# Patient Record
Sex: FEMALE | Race: BLACK OR AFRICAN AMERICAN | Employment: FULL TIME | URBAN - METROPOLITAN AREA
[De-identification: names, ages, dates, MRNs, and addresses within clinical notes are randomized per-mention and may not be internally consistent; named-entity substitution may affect disease eponyms.]

---

## 2017-02-02 ENCOUNTER — HOSPITAL ENCOUNTER (OUTPATIENT)
Dept: MAMMOGRAPHY | Age: 42
Discharge: HOME | End: 2017-02-02
Attending: OBSTETRICS & GYNECOLOGY | Admitting: OBSTETRICS & GYNECOLOGY

## 2017-02-02 DIAGNOSIS — Z12.31 ENCOUNTER FOR SCREENING MAMMOGRAM FOR BREAST CANCER: ICD-10-CM

## 2018-08-30 ENCOUNTER — HOSPITAL ENCOUNTER (OUTPATIENT)
Dept: MAMMOGRAPHY | Age: 43
Discharge: HOME | End: 2018-09-04
Payer: COMMERCIAL

## 2018-08-30 DIAGNOSIS — Z12.31 VISIT FOR SCREENING MAMMOGRAM: ICD-10-CM

## 2018-08-30 PROCEDURE — 77067 SCR MAMMO BI INCL CAD: CPT

## 2019-08-29 ENCOUNTER — HOSPITAL ENCOUNTER (OUTPATIENT)
Dept: MAMMOGRAPHY | Age: 44
Discharge: HOME | End: 2019-09-03
Payer: COMMERCIAL

## 2019-08-29 DIAGNOSIS — Z12.31 VISIT FOR SCREENING MAMMOGRAM: ICD-10-CM

## 2019-08-29 PROCEDURE — 77063 BREAST TOMOSYNTHESIS BI: CPT

## 2020-10-23 ENCOUNTER — HOSPITAL ENCOUNTER (OUTPATIENT)
Dept: MAMMOGRAPHY | Age: 45
Discharge: HOME | End: 2020-10-23
Payer: COMMERCIAL

## 2020-10-23 PROCEDURE — 77063 BREAST TOMOSYNTHESIS BI: CPT

## 2021-02-16 ENCOUNTER — HOSPITAL ENCOUNTER (OUTPATIENT)
Dept: PHYSICAL THERAPY | Age: 46
Setting detail: THERAPIES SERIES
Discharge: HOME | End: 2021-02-16
Payer: COMMERCIAL

## 2021-02-16 PROCEDURE — 97110 THERAPEUTIC EXERCISES: CPT

## 2021-02-16 PROCEDURE — 97161 PT EVAL LOW COMPLEX 20 MIN: CPT

## 2021-02-16 NOTE — FLOWSHEET NOTE
[facility] Physical Therapy  Phone: [phone]   Fax: [phone]    Physical Therapy Daily Treatment Note  Date:  2021    Patient Name:  Robyn Villela    :  1975  MRN: [id number]  Medical/Treatment Diagnosis Information:  Diagnosis: low back pain  Treatment Diagnosis: decreased thor spine mobility, limited B GHJ AROM, decreased L GHJ strength, adhesions in soft tissue through scapulothoracic region on L  Insurance/Certification information:  PT Insurance Information: Aetna (med nec, no dry needling)  Physician Information:  Referring Practitioner: Dominique Mcdonnell MD  Plan of care signed (Y/N): []  Yes [x]  No     Date of Patient follow up with Physician: ?     Progress Report: []  Yes  [x]  No     Date Range for reporting period:  Beginnin21  Ending:     Progress report due (10 Rx/or 30 days whichever is less): visit #10 or      Recertification due (POC duration/ or 90 days whichever is less): visit #10 or 21     Visit # Insurance Allowable Auth required?  Date Range   1/10 Med nec, NO DRY NEEDLING []  Yes  [x]  No n/a     Latex Allergy:  [x]NO      []YES  Preferred Language for Healthcare:   [x]English       []other:    Functional Scale:        Date assessed:  QDASH: raw score = 31; dysfunction = 45%                               21      Pain level:  7/10     SUBJECTIVE:  See eval    OBJECTIVE: See eval      RESTRICTIONS/PRECAUTIONS:     Exercises/Interventions:     Therapeutic Exercises (48944) Resistance / level Sets/sec Reps Notes   Wand flexion  1 10    Wand abd  1 10    Sitting scap retract   1 10                                              Therapeutic Activities (70270)                                          Neuromuscular Re-ed (41854)                                                 Manual Intervention (91958)                                                     Modalities:     Pt. Education:  : patient educated on diagnosis, prognosis and expectations for rehab, all patient questions were answered    Home Exercise Program:  Access Code: [facility]   URL: [email or web address]   Date: 02/16/2021   Prepared by: Zehra Figueroa     Exercises   Supine Shoulder Flexion with Dowel - 10 reps - 3 sets - 1x daily - 7x weekly   Supine Shoulder Abduction AAROM with Dowel - 10 reps - 3 sets - 1x daily - 7x weekly   Seated Scapular Retraction - 10 reps - 3 sets - 1x daily - 7x weekly         Therapeutic Exercise and NMR EXR  [x] (62775) Provided verbal/tactile cueing for activities related to strengthening, flexibility, endurance, ROM for improvements in  [] LE / Lumbar: LE, proximal hip, and core control with self care, mobility, lifting, ambulation. [x] UE / Cervical: cervical, postural, scapular, scapulothoracic and UE control with self care, reaching, carrying, lifting, house/yardwork, driving, computer work.  [] (38056) Provided verbal/tactile cueing for activities related to improving balance, coordination, kinesthetic sense, posture, motor skill, proprioception to assist with   [] LE / lumbar: LE, proximal hip, and core control in self care, mobility, lifting, ambulation and eccentric single leg control. [] UE / cervical: cervical, scapular, scapulothoracic and UE control with self care, reaching, carrying, lifting, house/yardwork, driving, computer work.   [] (11263) Therapist is in constant attendance of 2 or more patients providing skilled therapy interventions, but not providing any significant amount of measurable one-on-one time to either patient, for improvements in  [] LE / lumbar: LE, proximal hip, and core control in self care, mobility, lifting, ambulation and eccentric single leg control. [] UE / cervical: cervical, scapular, scapulothoracic and UE control with self care, reaching, carrying, lifting, house/yardwork, driving, computer work.      NMR and Therapeutic Activities:    [] (66658 or 40424) Provided verbal/tactile cueing for activities related to improving balance, coordination, kinesthetic sense, posture, motor skill, proprioception and motor activation to allow for proper function of   [] LE: / Lumbar core, proximal hip and LE with self care and ADLs  [] UE / Cervical: cervical, postural, scapular, scapulothoracic and UE control with self care, carrying, lifting, driving, computer work.   [] (20834) Gait Re-education- Provided training and instruction to the patient for proper LE, core and proximal hip recruitment and positioning and eccentric body weight control with ambulation re-education including up and down stairs     Home Management Training / Self Care:  [] (76923) Provided self-care/home management training related to activities of daily living and compensatory training, and/or use of adaptive equipment for improvement with: ADLs and compensatory training, meal preparation, safety procedures and instruction in use of adaptive equipment, including bathing, grooming, dressing, personal hygiene, basic household cleaning and chores.      Home Exercise Program:    [x] (06711) Reviewed/Progressed HEP activities related to strengthening, flexibility, endurance, ROM of   [] LE / Lumbar: core, proximal hip and LE for functional self-care, mobility, lifting and ambulation/stair navigation   [x] UE / Cervical: cervical, postural, scapular, scapulothoracic and UE control with self care, reaching, carrying, lifting, house/yardwork, driving, computer work  [] (36348)Reviewed/Progressed HEP activities related to improving balance, coordination, kinesthetic sense, posture, motor skill, proprioception of   [] LE: core, proximal hip and LE for self care, mobility, lifting, and ambulation/stair navigation    [] UE / Cervical: cervical, postural,  scapular, scapulothoracic and UE control with self care, reaching, carrying, lifting, house/yardwork, driving, computer work    Manual Treatments:  PROM / STM / Oscillations-Mobs:  G-I, II, III, IV (PA's, Inf., Post.)  [] (52033) Provided manual therapy to mobilize LE, proximal hip and/or LS spine soft tissue/joints for the purpose of modulating pain, promoting relaxation,  increasing ROM, reducing/eliminating soft tissue swelling/inflammation/restriction, improving soft tissue extensibility and allowing for proper ROM for normal function with   [] LE / lumbar: self care, mobility, lifting and ambulation. [] UE / Cervical: self care, reaching, carrying, lifting, house/yardwork, driving, computer work. Modalities:  [] (53600) Vasopneumatic compression: Utilized vasopneumatic compression to decrease edema / swelling for the purpose of improving mobility and quad tone / recruitment which will allow for increased overall function including but not limited to self-care, transfers, ambulation, and ascending / descending stairs. Charges:  Timed Code Treatment Minutes: 30   Total Treatment Minutes: 45     [x] EVAL - LOW (75492) x 1  [] EVAL - MOD (21187)  [] EVAL - HIGH (42484)  [] RE-EVAL (93385)  [x] TQ(79481) x 2       [] Ionto  [] NMR (11368) x       [] Vaso  [] Manual (99285) x       [] Ultrasound  [] TA x        [] Mech Traction (15286)  [] Aquatic Therapy x     [] ES (un) (91488):   [] Home Management Training x  [] ES(attended) (80869)   [] Dry Needling 1-2 muscles (29184):  [] Dry Needling 3+ muscles (571838)  [] Group:      [] Other:     GOALS:  Short Term Goals: To be achieved in: 2 weeks  1. Independent in HEP and progression per patient tolerance, in order to prevent re-injury. []? Progressing: []? Met: []? Not Met: []? Adjusted  2. Patient will have a decrease in pain to facilitate improvement in movement, function, and ADLs as indicated by Functional Deficits. []? Progressing: []? Met: []? Not Met: []? Adjusted     Long Term Goals: To be achieved in: 5 weeks  1.  Disability index score of 10% or less for the QDASH to assist with reaching prior level of function. []? Progressing: []? Met: []? Not Met: []? Adjusted  2. Patient will demonstrate increased AROM in L GHJ to be equal or greater than ROM in R GHJ to allow for proper joint functioning as indicated by patients Functional Deficits. []? Progressing: []? Met: []? Not Met: []? Adjusted  3. Patient will demonstrate an increase in postural awareness and control and activation of deep cervical stabilizers to allow for proper functional mobility as indicated by patients Functional Deficits. []? Progressing: []? Met: []? Not Met: []? Adjusted  4. Patient will demonstrate an increase in Strength in L GHJ to 5/5 to allow for proper functional mobility as indicated by patients Functional Deficits. []? Progressing: []? Met: []? Not Met: []? Adjusted    Overall Progression Towards Functional goals/ Treatment Progress Update:  [] Patient is progressing as expected towards functional goals listed. [] Progression is slowed due to complexities/Impairments listed. [] Progression has been slowed due to co-morbidities.   [x] Plan just implemented, too soon to assess goals progression <30days   [] Goals require adjustment due to lack of progress  [] Patient is not progressing as expected and requires additional follow up with physician  [] Other    Persisting Functional Limitations/Impairments:  []Sleeping []Sitting               []Standing []Transfers        []Walking []Kneeling               []Stairs []Squatting / bending   []ADLs [x]Reaching  [x]Lifting  []Housework  [x]Driving []Job related tasks  []Sports/Recreation []Other:        ASSESSMENT:  See eval    Treatment/Activity Tolerance:  [x] Patient able to complete tx  [] Patient limited by fatigue  [] Patient limited by pain  [] Patient limited by other medical complications  [] Other:     Prognosis: [x] Good [] Fair  [] Poor    Patient Requires Follow-up: [x] Yes  [] No    Plan for next treatment session:  Manual to L GHJ and thor spine for increased mobility, L GHJ strengthening, mid back strength, posture retraining, MOC     PLAN: See eval. PT 2x / week for 5 weeks. [] Continue per plan of care [] Alter current plan (see comments)  [x] Plan of care initiated [] Hold pending MD visit [] Discharge    Electronically signed by: Rosy John PT, DPT    Note: If patient does not return for scheduled/ recommended follow up visits, this note will serve as a discharge from care along with most recent update on progress.
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
## 2021-02-23 ENCOUNTER — HOSPITAL ENCOUNTER (OUTPATIENT)
Dept: PHYSICAL THERAPY | Age: 46
Setting detail: THERAPIES SERIES
Discharge: HOME | End: 2021-02-23
Payer: COMMERCIAL

## 2021-02-23 PROCEDURE — 97140 MANUAL THERAPY 1/> REGIONS: CPT

## 2021-02-23 PROCEDURE — 97110 THERAPEUTIC EXERCISES: CPT

## 2021-02-23 NOTE — FLOWSHEET NOTE
[address] [facility]  Phone: [phone]   Fax: [phone]    Physical Therapy Daily Treatment Note  Date:  2021    Patient Name:  Tonya Hernandez    :  1975  MRN: [id number]  Medical/Treatment Diagnosis Information:  Diagnosis: low back pain (actually thor pain)  Treatment Diagnosis: decreased thor spine mobility, limited B GHJ AROM, decreased L GHJ strength, adhesions in soft tissue through scapulothoracic region on L  Insurance/Certification information:  PT Insurance Information: [employer] (med nec, no dry needling)  Physician Information:  Referring Practitioner: Aliya Camarena MD  Plan of care signed (Y/N): []  Yes [x]  No     Date of Patient follow up with Physician: ?     Progress Report: []  Yes  [x]  No     Date Range for reporting period:  Beginnin21  Ending:     Progress report due (10 Rx/or 30 days whichever is less): visit #10 or 98     Recertification due (POC duration/ or 90 days whichever is less): visit #10 or 21     Visit # Insurance Allowable Auth required? Date Range   2/10 Med nec, NO DRY NEEDLING []  Yes  [x]  No n/a     Latex Allergy:  [x]NO      []YES  Preferred Language for Healthcare:   [x]English       []other:    Functional Scale:        Date assessed:  QDASH: raw score = 31; dysfunction = 45%                               21      Pain level:  6.5/10     SUBJECTIVE: Pt reports she has been doing her HEP. Pain remains high.      OBJECTIVE:   : pt 15 min late       RESTRICTIONS/PRECAUTIONS:     Exercises/Interventions:     Therapeutic Exercises (12229) Resistance / level Sets/sec Reps Notes   Wand flexion     Wand abd     Sitting scap retract      UBE   X 2 min forward/ 2 min retro  About 10 rpm   Cables:  Mid rows   LPD   15#  15#   2  2   x10  x10                                Therapeutic Activities (45123)                                          Neuromuscular Re-ed (25916)       Thor ext over bolster in sitting   Scap retract around bolster in sitting  Red  Red  10 sec H  5 sec H x10  x15                                       Manual Intervention (19679)       DMT to B UTs and parapsinals, PA mobs to thor spine centrally and generally grade 2    X 8 min                                            Modalities:   2/23: MHP to cerv spine and thor spine in supine with towel roll under head x 10 min     Pt. Education:  2/16: patient educated on diagnosis, prognosis and expectations for rehab, all patient questions were answered    Home Exercise Program:  Access Code: [facility]RIAN[facility]   URL: [email or web address]   Date: 02/16/2021   Prepared by: Lawyer Torres     Exercises   Supine Shoulder Flexion with Dowel - 10 reps - 3 sets - 1x daily - 7x weekly   Supine Shoulder Abduction AAROM with Dowel - 10 reps - 3 sets - 1x daily - 7x weekly   Seated Scapular Retraction - 10 reps - 3 sets - 1x daily - 7x weekly         Therapeutic Exercise and NMR EXR  [x] (56770) Provided verbal/tactile cueing for activities related to strengthening, flexibility, endurance, ROM for improvements in  [] LE / Lumbar: LE, proximal hip, and core control with self care, mobility, lifting, ambulation. [x] UE / Cervical: cervical, postural, scapular, scapulothoracic and UE control with self care, reaching, carrying, lifting, house/yardwork, driving, computer work.  [] (57163) Provided verbal/tactile cueing for activities related to improving balance, coordination, kinesthetic sense, posture, motor skill, proprioception to assist with   [] LE / lumbar: LE, proximal hip, and core control in self care, mobility, lifting, ambulation and eccentric single leg control.    [] UE / cervical: cervical, scapular, scapulothoracic and UE control with self care, reaching, carrying, lifting, house/yardwork, driving, computer work.   [] (82713) Therapist is in constant attendance of 2 or more patients providing skilled therapy interventions, but not providing any significant amount of measurable one-on-one time to either patient, for improvements in  [] LE / lumbar: LE, proximal hip, and core control in self care, mobility, lifting, ambulation and eccentric single leg control. [] UE / cervical: cervical, scapular, scapulothoracic and UE control with self care, reaching, carrying, lifting, house/yardwork, driving, computer work. NMR and Therapeutic Activities:    [] (99782 or 25354) Provided verbal/tactile cueing for activities related to improving balance, coordination, kinesthetic sense, posture, motor skill, proprioception and motor activation to allow for proper function of   [] LE: / Lumbar core, proximal hip and LE with self care and ADLs  [] UE / Cervical: cervical, postural, scapular, scapulothoracic and UE control with self care, carrying, lifting, driving, computer work.   [] (22511) Gait Re-education- Provided training and instruction to the patient for proper LE, core and proximal hip recruitment and positioning and eccentric body weight control with ambulation re-education including up and down stairs     Home Management Training / Self Care:  [] (86690) Provided self-care/home management training related to activities of daily living and compensatory training, and/or use of adaptive equipment for improvement with: ADLs and compensatory training, meal preparation, safety procedures and instruction in use of adaptive equipment, including bathing, grooming, dressing, personal hygiene, basic household cleaning and chores.      Home Exercise Program:    [] (09777) Reviewed/Progressed HEP activities related to strengthening, flexibility, endurance, ROM of   [] LE / Lumbar: core, proximal hip and LE for functional self-care, mobility, lifting and ambulation/stair navigation   [] UE / Cervical: cervical, postural, scapular, scapulothoracic and UE control with self care, reaching, carrying, lifting, house/yardwork, driving, computer work  [] progression per patient tolerance, in order to prevent re-injury. []? Progressing: []? Met: []? Not Met: []? Adjusted  2. Patient will have a decrease in pain to facilitate improvement in movement, function, and ADLs as indicated by Functional Deficits. []? Progressing: []? Met: []? Not Met: []? Adjusted     Long Term Goals: To be achieved in: 5 weeks  1. Disability index score of 10% or less for the QDASH to assist with reaching prior level of function. []? Progressing: []? Met: []? Not Met: []? Adjusted  2. Patient will demonstrate increased AROM in L GHJ to be equal or greater than ROM in R GHJ to allow for proper joint functioning as indicated by patients Functional Deficits. []? Progressing: []? Met: []? Not Met: []? Adjusted  3. Patient will demonstrate an increase in postural awareness and control and activation of deep cervical stabilizers to allow for proper functional mobility as indicated by patients Functional Deficits. []? Progressing: []? Met: []? Not Met: []? Adjusted  4. Patient will demonstrate an increase in Strength in L GHJ to 5/5 to allow for proper functional mobility as indicated by patients Functional Deficits. []? Progressing: []? Met: []? Not Met: []? Adjusted    Overall Progression Towards Functional goals/ Treatment Progress Update:  [] Patient is progressing as expected towards functional goals listed. [] Progression is slowed due to complexities/Impairments listed. [] Progression has been slowed due to co-morbidities.   [x] Plan just implemented, too soon to assess goals progression <30days   [] Goals require adjustment due to lack of progress  [] Patient is not progressing as expected and requires additional follow up with physician  [] Other    Persisting Functional Limitations/Impairments:  []Sleeping []Sitting               []Standing []Transfers        []Walking []Kneeling               []Stairs []Squatting / bending   []ADLs [x]Reaching  [x]Lifting

## 2021-02-27 ENCOUNTER — HOSPITAL ENCOUNTER (OUTPATIENT)
Dept: PHYSICAL THERAPY | Age: 46
Setting detail: THERAPIES SERIES
Discharge: HOME | End: 2021-02-27
Payer: COMMERCIAL

## 2021-02-27 PROCEDURE — 97110 THERAPEUTIC EXERCISES: CPT

## 2021-02-27 PROCEDURE — 97140 MANUAL THERAPY 1/> REGIONS: CPT

## 2021-02-27 PROCEDURE — 97112 NEUROMUSCULAR REEDUCATION: CPT

## 2021-02-27 NOTE — FLOWSHEET NOTE
Activities (06025)                                          Neuromuscular Re-ed (33398)       Thor ext over bolster in sitting   Scap retract around bolster in sitting  Red  Red  10 sec H  5 sec H x10  x15    Self foam roll to scap on wall   Rhomboid stretch and pressure with foam roll on wall    X 2 min  X 2 min      UT stretch   LS stretch   30 sec  30 sec x2 B  x2 B                          Manual Intervention (37051)       DMT to B UTs and parapsinals, PA mobs to thor spine centrally and generally grade 2        DTM to L UT and LS, hawkgrips #9 brushes and strums to L UT   X 9 min                                     Modalities:   2/27: MHP to cerv spine and thor spine in supine with warm blanket x 15 min   2/23: MHP to cerv spine and thor spine in supine with towel roll under head x 10 min     Pt. Education:  2/16: patient educated on diagnosis, prognosis and expectations for rehab, all patient questions were answered    Home Exercise Program:  Access Code: [email or web address]  URL: [email or web address]  Date: 02/27/2021  Prepared by: Deneen Ip    Exercises  Seated Cervical Sidebending Stretch - 10 reps - 3 sets - 1x daily - 7x weekly  Seated Levator Scapulae Stretch - 10 reps - 3 sets - 1x daily - 7x weekly    Access Code: [facility]RIANDA [facility]   URL: [email or web address]   Date: 02/16/2021   Prepared by: Deneen Ip     Exercises   Supine Shoulder Flexion with Dowel - 10 reps - 3 sets - 1x daily - 7x weekly   Supine Shoulder Abduction AAROM with Dowel - 10 reps - 3 sets - 1x daily - 7x weekly   Seated Scapular Retraction - 10 reps - 3 sets - 1x daily - 7x weekly         Therapeutic Exercise and NMR EXR  [x] (08629) Provided verbal/tactile cueing for activities related to strengthening, flexibility, endurance, ROM for improvements in  [] LE / Lumbar: LE, proximal hip, and core control with self care, mobility, lifting, ambulation.   [x] UE / Cervical: cervical, postural, scapular, scapulothoracic and UE control with self care, reaching, carrying, lifting, house/yardwork, driving, computer work.  [] (10849) Provided verbal/tactile cueing for activities related to improving balance, coordination, kinesthetic sense, posture, motor skill, proprioception to assist with   [] LE / lumbar: LE, proximal hip, and core control in self care, mobility, lifting, ambulation and eccentric single leg control. [] UE / cervical: cervical, scapular, scapulothoracic and UE control with self care, reaching, carrying, lifting, house/yardwork, driving, computer work.   [] (44204) Therapist is in constant attendance of 2 or more patients providing skilled therapy interventions, but not providing any significant amount of measurable one-on-one time to either patient, for improvements in  [] LE / lumbar: LE, proximal hip, and core control in self care, mobility, lifting, ambulation and eccentric single leg control. [] UE / cervical: cervical, scapular, scapulothoracic and UE control with self care, reaching, carrying, lifting, house/yardwork, driving, computer work.      NMR and Therapeutic Activities:    [x] (28535 or 43441) Provided verbal/tactile cueing for activities related to improving balance, coordination, kinesthetic sense, posture, motor skill, proprioception and motor activation to allow for proper function of   [] LE: / Lumbar core, proximal hip and LE with self care and ADLs  [x] UE / Cervical: cervical, postural, scapular, scapulothoracic and UE control with self care, carrying, lifting, driving, computer work.   [] (74343) Gait Re-education- Provided training and instruction to the patient for proper LE, core and proximal hip recruitment and positioning and eccentric body weight control with ambulation re-education including up and down stairs     Home Management Training / Self Care:  [] (35797) Provided self-care/home management training related to activities of daily living and compensatory training, and/or use of adaptive equipment for improvement with: ADLs and compensatory training, meal preparation, safety procedures and instruction in use of adaptive equipment, including bathing, grooming, dressing, personal hygiene, basic household cleaning and chores. Home Exercise Program:    [] (06793) Reviewed/Progressed HEP activities related to strengthening, flexibility, endurance, ROM of   [] LE / Lumbar: core, proximal hip and LE for functional self-care, mobility, lifting and ambulation/stair navigation   [] UE / Cervical: cervical, postural, scapular, scapulothoracic and UE control with self care, reaching, carrying, lifting, house/yardwork, driving, computer work  [] (44810)Reviewed/Progressed HEP activities related to improving balance, coordination, kinesthetic sense, posture, motor skill, proprioception of   [] LE: core, proximal hip and LE for self care, mobility, lifting, and ambulation/stair navigation    [] UE / Cervical: cervical, postural,  scapular, scapulothoracic and UE control with self care, reaching, carrying, lifting, house/yardwork, driving, computer work    Manual Treatments:  PROM / STM / Oscillations-Mobs:  G-I, II, III, IV (PA's, Inf., Post.)  [x] (30460) Provided manual therapy to mobilize LE, proximal hip and/or LS spine soft tissue/joints for the purpose of modulating pain, promoting relaxation,  increasing ROM, reducing/eliminating soft tissue swelling/inflammation/restriction, improving soft tissue extensibility and allowing for proper ROM for normal function with   [] LE / lumbar: self care, mobility, lifting and ambulation. [x] UE / Cervical: self care, reaching, carrying, lifting, house/yardwork, driving, computer work.      Modalities:  [] (50705) Vasopneumatic compression: Utilized vasopneumatic compression to decrease edema / swelling for the purpose of improving mobility and quad tone / recruitment which will allow for increased overall function including but not limited to self-care, transfers, ambulation, and ascending / descending stairs. Charges:  Timed Code Treatment Minutes: 40   Total Treatment Minutes: 55     [] EVAL - LOW (88872)   [] EVAL - MOD (06563)  [] EVAL - HIGH (11667)  [] RE-EVAL (57423)  [x] GL(47556) x 1       [] Ionto  [x] NMR (27060) x 1      [] Vaso  [x] Manual (73484) x 1      [] Ultrasound  [] TA x        [] Mech Traction (16339)  [] Aquatic Therapy x                     [] ES (un) (38032):   [] Home Management Training x  [] ES(attended) (94082)   [] Dry Needling 1-2 muscles (51055):  [] Dry Needling 3+ muscles (789605)  [] Group:      [] Other:     GOALS:  Short Term Goals: To be achieved in: 2 weeks  1. Independent in HEP and progression per patient tolerance, in order to prevent re-injury. []? Progressing: []? Met: []? Not Met: []? Adjusted  2. Patient will have a decrease in pain to facilitate improvement in movement, function, and ADLs as indicated by Functional Deficits. []? Progressing: []? Met: []? Not Met: []? Adjusted     Long Term Goals: To be achieved in: 5 weeks  1. Disability index score of 10% or less for the QDASH to assist with reaching prior level of function. []? Progressing: []? Met: []? Not Met: []? Adjusted  2. Patient will demonstrate increased AROM in L GHJ to be equal or greater than ROM in R GHJ to allow for proper joint functioning as indicated by patients Functional Deficits. []? Progressing: []? Met: []? Not Met: []? Adjusted  3. Patient will demonstrate an increase in postural awareness and control and activation of deep cervical stabilizers to allow for proper functional mobility as indicated by patients Functional Deficits. []? Progressing: []? Met: []? Not Met: []? Adjusted  4. Patient will demonstrate an increase in Strength in L GHJ to 5/5 to allow for proper functional mobility as indicated by patients Functional Deficits. []? Progressing: []? Met: []? Not Met: []?  Adjusted    Overall Progression Towards Functional goals/ Treatment Progress Update:  [] Patient is progressing as expected towards functional goals listed. [] Progression is slowed due to complexities/Impairments listed. [] Progression has been slowed due to co-morbidities. [x] Plan just implemented, too soon to assess goals progression <30days   [] Goals require adjustment due to lack of progress  [] Patient is not progressing as expected and requires additional follow up with physician  [] Other    Persisting Functional Limitations/Impairments:  []Sleeping []Sitting               []Standing []Transfers        []Walking []Kneeling               []Stairs []Squatting / bending   []ADLs [x]Reaching  [x]Lifting  []Housework  [x]Driving []Job related tasks  []Sports/Recreation []Other:        ASSESSMENT:  Point tender to L UT this date. Heat and stretching feel good. Unable to complete D2 flexion on L arm due to pain. Continue with gentle length, strength, and positioning of scap. Treatment/Activity Tolerance:  [x] Patient able to complete tx  [] Patient limited by fatigue  [] Patient limited by pain  [] Patient limited by other medical complications  [] Other:     Prognosis: [x] Good [] Fair  [] Poor    Patient Requires Follow-up: [x] Yes  [] No    Plan for next treatment session:  Manual to L GHJ and thor spine for increased mobility, L GHJ strengthening, mid back strength, posture retraining, MOC     PLAN: See eliz PT 2x / week for 5 weeks. [x] Continue per plan of care [] Alter current plan (see comments)  [] Plan of care initiated [] Hold pending MD visit [] Discharge    Electronically signed by: Julito Ragland PT, DPT    Note: If patient does not return for scheduled/ recommended follow up visits, this note will serve as a discharge from care along with most recent update on progress.

## 2021-03-02 ENCOUNTER — HOSPITAL ENCOUNTER (OUTPATIENT)
Dept: PHYSICAL THERAPY | Age: 46
Setting detail: THERAPIES SERIES
Discharge: HOME | End: 2021-03-02
Payer: COMMERCIAL

## 2021-03-02 PROCEDURE — 97140 MANUAL THERAPY 1/> REGIONS: CPT

## 2021-03-02 PROCEDURE — 97110 THERAPEUTIC EXERCISES: CPT

## 2021-03-02 PROCEDURE — 97112 NEUROMUSCULAR REEDUCATION: CPT

## 2021-03-02 NOTE — FLOWSHEET NOTE
[address] [facility] Physical Therapy  Phone: [phone]   Fax: [phone]    Physical Therapy Daily Treatment Note  Date:  3/2/2021    Patient Name:  Spike Page    :  1975  MRN: [id number]  Medical/Treatment Diagnosis Information:  Diagnosis: low back pain (actually thor pain)  Treatment Diagnosis: decreased thor spine mobility, limited B GHJ AROM, decreased L GHJ strength, adhesions in soft tissue through scapulothoracic region on L  Insurance/Certification information:  PT Insurance Information: Aetna (med Jemma Hockey, no dry needling)  Physician Information:  Referring Practitioner: Valeria Avila MD  Plan of care signed (Y/N): []  Yes [x]  No     Date of Patient follow up with Physician: ?     Progress Report: []  Yes  [x]  No     Date Range for reporting period:  Beginnin21  Ending:     Progress report due (10 Rx/or 30 days whichever is less): visit #10 or      Recertification due (POC duration/ or 90 days whichever is less): visit #10 or 21     Visit # Insurance Allowable Auth required?  Date Range   4/10 Med nec, NO DRY NEEDLING []  Yes  [x]  No n/a     Latex Allergy:  [x]NO      []YES  Preferred Language for Healthcare:   [x]English       []other:    Functional Scale:        Date assessed:  QDASH: raw score = 31; dysfunction = 45%                               21      Pain level:  6/10      SUBJECTIVE: Pt reports       OBJECTIVE:   : pt 15 min late       RESTRICTIONS/PRECAUTIONS:     Exercises/Interventions:     Therapeutic Exercises (92119) Resistance / level Sets/sec Reps Notes   Wand flexion     Wand abd     Sitting scap retract      UBE    About 10 rpm   Cables:  Mid rows   LPD  D2 flexion    15#  15#     2  2     x10  x10         Attempted on L but too painful    Pulleys:  Flexion  scaption      X 2 min  X 2 min                          Therapeutic Activities (21531)                                          Neuromuscular Re-ed (87793)       Thor ext over bolster in sitting   Scap retract around bolster in sitting  Red  Red  10 sec H  5 sec H x10  x15    Self foam roll to scap on wall   Rhomboid stretch and pressure with foam roll on wall          UT stretch   LS stretch   30 sec  30 sec x2 B  x2 B     1/2 foam roll on wall:  GHJ alt flexion  GJH abd     1  1   x10  x10    Self trigger point release to L UT with theracane    X 3 min            Manual Intervention (71177)       DMT to B UTs and parapsinals, PA mobs to thor spine centrally and generally grade 2        DTM to L UT and LS, hawkgrips #9 brushes and strums to L UT   X 9 min                                     Modalities:   2/27: MHP to cerv spine and thor spine in supine with warm blanket x 15 min   2/23: MHP to cerv spine and thor spine in supine with towel roll under head x 10 min     Pt. Education:  2/16: patient educated on diagnosis, prognosis and expectations for rehab, all patient questions were answered    Home Exercise Program:  Access Code: [email or web address]  URL: [email or web address]  Date: 02/27/2021  Prepared by: Eneida Wolfe    Exercises  Seated Cervical Sidebending Stretch - 10 reps - 3 sets - 1x daily - 7x weekly  Seated Levator Scapulae Stretch - 10 reps - 3 sets - 1x daily - 7x weekly    Access Code: [facility]   URL: [employer]/   Date: 02/16/2021   Prepared by: Eneida Wolfe     Exercises   Supine Shoulder Flexion with Dowel - 10 reps - 3 sets - 1x daily - 7x weekly   Supine Shoulder Abduction AAROM with Dowel - 10 reps - 3 sets - 1x daily - 7x weekly   Seated Scapular Retraction - 10 reps - 3 sets - 1x daily - 7x weekly         Therapeutic Exercise and NMR EXR  [x] (43209) Provided verbal/tactile cueing for activities related to strengthening, flexibility, endurance, ROM for improvements in  [] LE / Lumbar: LE, proximal hip, and core control with self care, mobility, lifting, ambulation.   [x] UE / Cervical: cervical, postural, scapular, scapulothoracic and UE control with self care, reaching, carrying, lifting, house/yardwork, driving, computer work.  [] (80170) Provided verbal/tactile cueing for activities related to improving balance, coordination, kinesthetic sense, posture, motor skill, proprioception to assist with   [] LE / lumbar: LE, proximal hip, and core control in self care, mobility, lifting, ambulation and eccentric single leg control. [] UE / cervical: cervical, scapular, scapulothoracic and UE control with self care, reaching, carrying, lifting, house/yardwork, driving, computer work.   [] (13821) Therapist is in constant attendance of 2 or more patients providing skilled therapy interventions, but not providing any significant amount of measurable one-on-one time to either patient, for improvements in  [] LE / lumbar: LE, proximal hip, and core control in self care, mobility, lifting, ambulation and eccentric single leg control. [] UE / cervical: cervical, scapular, scapulothoracic and UE control with self care, reaching, carrying, lifting, house/yardwork, driving, computer work.      NMR and Therapeutic Activities:    [x] (12172 or 54660) Provided verbal/tactile cueing for activities related to improving balance, coordination, kinesthetic sense, posture, motor skill, proprioception and motor activation to allow for proper function of   [] LE: / Lumbar core, proximal hip and LE with self care and ADLs  [x] UE / Cervical: cervical, postural, scapular, scapulothoracic and UE control with self care, carrying, lifting, driving, computer work.   [] (59161) Gait Re-education- Provided training and instruction to the patient for proper LE, core and proximal hip recruitment and positioning and eccentric body weight control with ambulation re-education including up and down stairs     Home Management Training / Self Care:  [] (23875) Provided self-care/home management training related to activities of daily living and compensatory training, and/or use of adaptive equipment for improvement with: ADLs and compensatory training, meal preparation, safety procedures and instruction in use of adaptive equipment, including bathing, grooming, dressing, personal hygiene, basic household cleaning and chores. Home Exercise Program:    [] (02040) Reviewed/Progressed HEP activities related to strengthening, flexibility, endurance, ROM of   [] LE / Lumbar: core, proximal hip and LE for functional self-care, mobility, lifting and ambulation/stair navigation   [] UE / Cervical: cervical, postural, scapular, scapulothoracic and UE control with self care, reaching, carrying, lifting, house/yardwork, driving, computer work  [] (80469)Reviewed/Progressed HEP activities related to improving balance, coordination, kinesthetic sense, posture, motor skill, proprioception of   [] LE: core, proximal hip and LE for self care, mobility, lifting, and ambulation/stair navigation    [] UE / Cervical: cervical, postural,  scapular, scapulothoracic and UE control with self care, reaching, carrying, lifting, house/yardwork, driving, computer work    Manual Treatments:  PROM / STM / Oscillations-Mobs:  G-I, II, III, IV (PA's, Inf., Post.)  [x] (54563) Provided manual therapy to mobilize LE, proximal hip and/or LS spine soft tissue/joints for the purpose of modulating pain, promoting relaxation,  increasing ROM, reducing/eliminating soft tissue swelling/inflammation/restriction, improving soft tissue extensibility and allowing for proper ROM for normal function with   [] LE / lumbar: self care, mobility, lifting and ambulation. [x] UE / Cervical: self care, reaching, carrying, lifting, house/yardwork, driving, computer work.      Modalities:  [] (91248) Vasopneumatic compression: Utilized vasopneumatic compression to decrease edema / swelling for the purpose of improving mobility and quad tone / recruitment which will allow for increased overall function including but not Towards Functional goals/ Treatment Progress Update:  [] Patient is progressing as expected towards functional goals listed. [] Progression is slowed due to complexities/Impairments listed. [] Progression has been slowed due to co-morbidities. [x] Plan just implemented, too soon to assess goals progression <30days   [] Goals require adjustment due to lack of progress  [] Patient is not progressing as expected and requires additional follow up with physician  [] Other    Persisting Functional Limitations/Impairments:  []Sleeping []Sitting               []Standing []Transfers        []Walking []Kneeling               []Stairs []Squatting / bending   []ADLs [x]Reaching  [x]Lifting  []Housework  [x]Driving []Job related tasks  []Sports/Recreation []Other:        ASSESSMENT:   Pt performed strengthening activities with greater ease today- no report of pain or discomfort. Continues to be limited with mobility, especially into extension. Responds well to hawkgrips. Plan to continue as above. Treatment/Activity Tolerance:  [x] Patient able to complete tx  [] Patient limited by fatigue  [] Patient limited by pain  [] Patient limited by other medical complications  [] Other:     Prognosis: [x] Good [] Fair  [] Poor    Patient Requires Follow-up: [x] Yes  [] No    Plan for next treatment session:  Manual to L GHJ and thor spine for increased mobility, L GHJ strengthening, mid back strength, posture retraining, MOC     PLAN: See yara. PT 2x / week for 5 weeks. [x] Continue per plan of care [] Alter current plan (see comments)  [] Plan of care initiated [] Hold pending MD visit [] Discharge    Electronically signed by: Telly Retana PT, DPT    Note: If patient does not return for scheduled/ recommended follow up visits, this note will serve as a discharge from care along with most recent update on progress.

## 2021-03-04 ENCOUNTER — HOSPITAL ENCOUNTER (OUTPATIENT)
Dept: PHYSICAL THERAPY | Age: 46
Setting detail: THERAPIES SERIES
Discharge: HOME | End: 2021-03-04
Payer: COMMERCIAL

## 2021-03-04 PROCEDURE — 97110 THERAPEUTIC EXERCISES: CPT

## 2021-03-04 PROCEDURE — 97112 NEUROMUSCULAR REEDUCATION: CPT

## 2021-03-04 PROCEDURE — 97140 MANUAL THERAPY 1/> REGIONS: CPT

## 2021-03-04 NOTE — FLOWSHEET NOTE
[address] [facility] Physical Therapy  Phone: [phone]   Fax: [phone]    Physical Therapy Daily Treatment Note  Date:  3/4/2021    Patient Name:  Joaquim Lemus    :  1975  MRN: [id number]  Medical/Treatment Diagnosis Information:  Diagnosis: low back pain (actually thor pain)  Treatment Diagnosis: decreased thor spine mobility, limited B GHJ AROM, decreased L GHJ strength, adhesions in soft tissue through scapulothoracic region on L  Insurance/Certification information:  PT Insurance Information: Aetna (med nec, no dry needling)  Physician Information:  Referring Practitioner: Maria R Streeter MD  Plan of care signed (Y/N): []  Yes [x]  No     Date of Patient follow up with Physician: ?     Progress Report: []  Yes  [x]  No     Date Range for reporting period:  Beginnin21  Ending:     Progress report due (10 Rx/or 30 days whichever is less): visit #10 or      Recertification due (POC duration/ or 90 days whichever is less): visit #10 or 21     Visit # Insurance Allowable Auth required? Date Range   5/10 Med nec, NO DRY NEEDLING []  Yes  [x]  No n/a     Latex Allergy:  [x]NO      []YES  Preferred Language for Healthcare:   [x]English       []other:    Functional Scale:        Date assessed:  QDASH: raw score = 31; dysfunction = 45%                               21      Pain level:  6/10      SUBJECTIVE: Pt reports she feels good after therapy, but it only lasts a couple of days. Today feeling pain between her shoulder blades and along the R side of her spine.        OBJECTIVE:   : pt 15 min late       RESTRICTIONS/PRECAUTIONS:     Exercises/Interventions:     Therapeutic Exercises (26503) Resistance / level Sets/sec Reps Notes   Wand flexion     Wand abd     Sitting scap retract      UBE    About 10 rpm   Cables:  Mid rows   LPD  D2 flexion    15#  15#     2  2     x10  x10         Attempted on L but too painful Pulleys:  Flexion  scaption      X 2 min  X 2 min                          Therapeutic Activities (57427)                                          Neuromuscular Re-ed (18644)       Thor ext over bolster in sitting   Scap retract around bolster in sitting  Red  Red  10 sec H  5 sec H x10  x15    Self foam roll to scap on wall   Rhomboid stretch and pressure with foam roll on wall          UT stretch   LS stretch   30 sec  30 sec x2 B  x2 B     1/2 foam roll on wall:  GHJ alt flexion  GJH abd     1  1   x10  x10    Self trigger point release to L UT with theracane         Wall forearm slide  1 10    Prone:  Retraction  Ext  Row    SL scap retraction      1  1      2   5  5      10 Unable to perform with scap retraction        Requires tactile cueing   Manual Intervention (47545)       DMT to B UTs and parapsinals, PA mobs to thor spine centrally and generally grade 2        DTM to L UT and LS, hawkgrips #9 brushes and strums to L UT, levator scap   X 8 min                                     Modalities:   3/4, 2/27: MHP to cerv spine and thor spine in supine with warm blanket x 15 min   2/23: MHP to cerv spine and thor spine in supine with towel roll under head x 10 min     Pt. Education:  2/16: patient educated on diagnosis, prognosis and expectations for rehab, all patient questions were answered    Home Exercise Program:  Access Code: [email or web address]  URL: [email or web address]  Date: 02/27/2021  Prepared by: Audrey Guthrie    Exercises  Seated Cervical Sidebending Stretch - 10 reps - 3 sets - 1x daily - 7x weekly  Seated Levator Scapulae Stretch - 10 reps - 3 sets - 1x daily - 7x weekly    Access Code: [facility]   URL: [email or web address]/   Date: 02/16/2021   Prepared by: Audrey Guthrie     Exercises   Supine Shoulder Flexion with Dowel - 10 reps - 3 sets - 1x daily - 7x weekly   Supine Shoulder Abduction AAROM with Dowel - 10 reps - 3 sets - 1x daily - 7x weekly   Seated Scapular Retraction - 10 reps - 3 sets - 1x daily - 7x weekly         Therapeutic Exercise and NMR EXR  [x] (88515) Provided verbal/tactile cueing for activities related to strengthening, flexibility, endurance, ROM for improvements in  [] LE / Lumbar: LE, proximal hip, and core control with self care, mobility, lifting, ambulation. [x] UE / Cervical: cervical, postural, scapular, scapulothoracic and UE control with self care, reaching, carrying, lifting, house/yardwork, driving, computer work.  [] (84015) Provided verbal/tactile cueing for activities related to improving balance, coordination, kinesthetic sense, posture, motor skill, proprioception to assist with   [] LE / lumbar: LE, proximal hip, and core control in self care, mobility, lifting, ambulation and eccentric single leg control. [] UE / cervical: cervical, scapular, scapulothoracic and UE control with self care, reaching, carrying, lifting, house/yardwork, driving, computer work.   [] (98216) Therapist is in constant attendance of 2 or more patients providing skilled therapy interventions, but not providing any significant amount of measurable one-on-one time to either patient, for improvements in  [] LE / lumbar: LE, proximal hip, and core control in self care, mobility, lifting, ambulation and eccentric single leg control. [] UE / cervical: cervical, scapular, scapulothoracic and UE control with self care, reaching, carrying, lifting, house/yardwork, driving, computer work.      NMR and Therapeutic Activities:    [x] (00428 or 21524) Provided verbal/tactile cueing for activities related to improving balance, coordination, kinesthetic sense, posture, motor skill, proprioception and motor activation to allow for proper function of   [] LE: / Lumbar core, proximal hip and LE with self care and ADLs  [x] UE / Cervical: cervical, postural, scapular, scapulothoracic and UE control with self care, carrying, lifting, driving, computer work.   [] (66082) Gait Re-education- Provided training and instruction to the patient for proper LE, core and proximal hip recruitment and positioning and eccentric body weight control with ambulation re-education including up and down stairs     Home Management Training / Self Care:  [] (28066) Provided self-care/home management training related to activities of daily living and compensatory training, and/or use of adaptive equipment for improvement with: ADLs and compensatory training, meal preparation, safety procedures and instruction in use of adaptive equipment, including bathing, grooming, dressing, personal hygiene, basic household cleaning and chores. Home Exercise Program:    [] (26711) Reviewed/Progressed HEP activities related to strengthening, flexibility, endurance, ROM of   [] LE / Lumbar: core, proximal hip and LE for functional self-care, mobility, lifting and ambulation/stair navigation   [] UE / Cervical: cervical, postural, scapular, scapulothoracic and UE control with self care, reaching, carrying, lifting, house/yardwork, driving, computer work  [] (83682)Reviewed/Progressed HEP activities related to improving balance, coordination, kinesthetic sense, posture, motor skill, proprioception of   [] LE: core, proximal hip and LE for self care, mobility, lifting, and ambulation/stair navigation    [] UE / Cervical: cervical, postural,  scapular, scapulothoracic and UE control with self care, reaching, carrying, lifting, house/yardwork, driving, computer work    Manual Treatments:  PROM / STM / Oscillations-Mobs:  G-I, II, III, IV (PA's, Inf., Post.)  [x] (25278) Provided manual therapy to mobilize LE, proximal hip and/or LS spine soft tissue/joints for the purpose of modulating pain, promoting relaxation,  increasing ROM, reducing/eliminating soft tissue swelling/inflammation/restriction, improving soft tissue extensibility and allowing for proper ROM for normal function with   [] LE / lumbar: self care, mobility, lifting and ambulation. [x] UE / Cervical: self care, reaching, carrying, lifting, house/yardwork, driving, computer work. Modalities:  [] (64237) Vasopneumatic compression: Utilized vasopneumatic compression to decrease edema / swelling for the purpose of improving mobility and quad tone / recruitment which will allow for increased overall function including but not limited to self-care, transfers, ambulation, and ascending / descending stairs. Charges:  Timed Code Treatment Minutes: 45   Total Treatment Minutes: 60     [] EVAL - LOW (00828)   [] EVAL - MOD (15732)  [] EVAL - HIGH (94354)  [] RE-EVAL (28453)  [x] PN(39483) x 1       [] Ionto  [x] NMR (37895) x 1      [] Vaso  [x] Manual (93530) x 1      [] Ultrasound  [] TA x        [] Mech Traction (58753)  [] Aquatic Therapy x                     [] ES (un) (62109):   [] Home Management Training x  [] ES(attended) (68291)   [] Dry Needling 1-2 muscles (03224):  [] Dry Needling 3+ muscles (180312)  [] Group:      [] Other:     GOALS:  Short Term Goals: To be achieved in: 2 weeks  1. Independent in HEP and progression per patient tolerance, in order to prevent re-injury. []? Progressing: []? Met: []? Not Met: []? Adjusted  2. Patient will have a decrease in pain to facilitate improvement in movement, function, and ADLs as indicated by Functional Deficits. []? Progressing: []? Met: []? Not Met: []? Adjusted     Long Term Goals: To be achieved in: 5 weeks  1. Disability index score of 10% or less for the QDASH to assist with reaching prior level of function. []? Progressing: []? Met: []? Not Met: []? Adjusted  2. Patient will demonstrate increased AROM in L GHJ to be equal or greater than ROM in R GHJ to allow for proper joint functioning as indicated by patients Functional Deficits. []? Progressing: []? Met: []? Not Met: []? Adjusted  3.  Patient will demonstrate an increase in postural awareness and control and activation of deep cervical stabilizers to allow for proper functional mobility as indicated by patients Functional Deficits. []? Progressing: []? Met: []? Not Met: []? Adjusted  4. Patient will demonstrate an increase in Strength in L GHJ to 5/5 to allow for proper functional mobility as indicated by patients Functional Deficits. []? Progressing: []? Met: []? Not Met: []? Adjusted    Overall Progression Towards Functional goals/ Treatment Progress Update:  [] Patient is progressing as expected towards functional goals listed. [] Progression is slowed due to complexities/Impairments listed. [] Progression has been slowed due to co-morbidities. [x] Plan just implemented, too soon to assess goals progression <30days   [] Goals require adjustment due to lack of progress  [] Patient is not progressing as expected and requires additional follow up with physician  [] Other    Persisting Functional Limitations/Impairments:  []Sleeping []Sitting               []Standing []Transfers        []Walking []Kneeling               []Stairs []Squatting / bending   []ADLs [x]Reaching  [x]Lifting  []Housework  [x]Driving []Job related tasks  []Sports/Recreation []Other:        ASSESSMENT:   Attempted to progress scapular strengthening/neuro re-ed this date, however pt unable to produce scap retraction against gravity despite cueing and PT assist. Able to perform in SL with tactile cueing. Pt encouraged to continue working on retraction at home throughout work day. Continue to progress general cervical and thoracic mobility as well as scapular neuro re-ed.      Treatment/Activity Tolerance:  [x] Patient able to complete tx  [] Patient limited by fatigue  [] Patient limited by pain  [] Patient limited by other medical complications  [] Other:     Prognosis: [x] Good [] Fair  [] Poor    Patient Requires Follow-up: [x] Yes  [] No    Plan for next treatment session:  Manual to L GHJ and thor spine for increased mobility, L GHJ strengthening, mid back strength, posture retraining, MOC PLAN: See eval. PT 2x / week for 5 weeks. [x] Continue per plan of care [] Alter current plan (see comments)  [] Plan of care initiated [] Hold pending MD visit [] Discharge    Electronically signed by: Romeo Kerr PT, DPT    Note: If patient does not return for scheduled/ recommended follow up visits, this note will serve as a discharge from care along with most recent update on progress.

## 2021-03-09 ENCOUNTER — HOSPITAL ENCOUNTER (OUTPATIENT)
Dept: PHYSICAL THERAPY | Age: 46
Setting detail: THERAPIES SERIES
Discharge: HOME | End: 2021-03-09
Payer: COMMERCIAL

## 2021-03-09 PROCEDURE — 97112 NEUROMUSCULAR REEDUCATION: CPT

## 2021-03-09 PROCEDURE — 97140 MANUAL THERAPY 1/> REGIONS: CPT

## 2021-03-09 PROCEDURE — 97110 THERAPEUTIC EXERCISES: CPT

## 2021-03-09 NOTE — FLOWSHEET NOTE
[address] [facility] Physical Therapy  Phone: [phone]   Fax: [phone]    Physical Therapy Daily Treatment Note  Date:  3/9/2021    Patient Name:  Rebekah Lopez    :  1975  MRN: [id number]  Medical/Treatment Diagnosis Information:  Diagnosis: low back pain (actually thor pain)  Treatment Diagnosis: decreased thor spine mobility, limited B GHJ AROM, decreased L GHJ strength, adhesions in soft tissue through scapulothoracic region on L  Insurance/Certification information:  PT Insurance Information: Aetna (med Chip Givens, no dry needling)  Physician Information:  Referring Practitioner: Abby Chanel MD  Plan of care signed (Y/N): []  Yes [x]  No     Date of Patient follow up with Physician: ?     Progress Report: []  Yes  [x]  No     Date Range for reporting period:  Beginnin21  Ending:     Progress report due (10 Rx/or 30 days whichever is less): visit #10 or 3/08/78     Recertification due (POC duration/ or 90 days whichever is less): visit #10 or 21     Visit # Insurance Allowable Auth required? Date Range   6/10 Med nec, NO DRY NEEDLING []  Yes  [x]  No n/a     Latex Allergy:  [x]NO      []YES  Preferred Language for Healthcare:   [x]English       []other:    Functional Scale:        Date assessed:  QDASH: raw score = 31; dysfunction = 45%                               21      Pain level:  5/10      SUBJECTIVE: Pt reports she actually feels like she is doing better. Pain is coming down some, but is still present. Tried to add some prone exercises this weekend, but couldn't. Does feel that the popping and clicking has decreased some.        OBJECTIVE:   : pt 15 min late       RESTRICTIONS/PRECAUTIONS:     Exercises/Interventions:     Therapeutic Exercises (77282) Resistance / level Sets/sec Reps Notes   Wand flexion     Wand abd     Sitting scap retract      UBE   3 min forward/ 1 min retro About 10 rpm   Cables:  Mid rows   LPD  D2 flexion Shoulder adduction    15#  15#  5#   2  2  2   x10  x10  x10 B       Attempted on L but too painful    Pulleys:  Flexion  scaption      X 2 min  X 2 min                          Therapeutic Activities (76851)                                          Neuromuscular Re-ed (65914)       Paul Zimmerman ext over bolster in sitting   Scap retract around bolster in sitting     Self foam roll to scap on wall   Rhomboid stretch and pressure with foam roll on wall     UT stretch   LS stretch     1/2 foam roll on wall:  GHJ alt flexion  GJH abd     1/2 foam roll along spine:  GHJ flexion  GHJ abd  X 2 min   X 2 min    Self trigger point release to L UT with theracane         Wall forearm slide     Prone:  Retraction  Ext  Row    SL scap retraction     Prove over SB:  GHJ ext  GHJ abd   Rows       4  2  2   5  5  10                         Manual Intervention (46460)       DMT to B UTs and parapsinals, PA mobs to thor spine centrally and generally grade 2        DTM to L UT and LS, hawkgrips #9 brushes and strums to L UT, levator scap        DTM and trigger point release to L UT   X 8 min                             Modalities:   3/4, 2/27: MHP to cerv spine and thor spine in supine with warm blanket x 15 min   2/23: MHP to cerv spine and thor spine in supine with towel roll under head x 10 min     Pt. Education:  2/16: patient educated on diagnosis, prognosis and expectations for rehab, all patient questions were answered    Home Exercise Program:  Access Code: [email or web address]  URL: [email or web address]/  Date: 02/27/2021  Prepared by: Deneen Ip    Exercises  Seated Cervical Sidebending Stretch - 10 reps - 3 sets - 1x daily - 7x weekly  Seated Levator Scapulae Stretch - 10 reps - 3 sets - 1x daily - 7x weekly    Access Code: [facility]   URL: [employer]/   Date: 02/16/2021   Prepared by: Deneen Ip     Exercises   Supine Shoulder Flexion with Dowel - 10 reps - 3 sets - 1x daily - 7x weekly   Supine Shoulder Abduction AAROM with Dowel - 10 reps - 3 sets - 1x daily - 7x weekly   Seated Scapular Retraction - 10 reps - 3 sets - 1x daily - 7x weekly         Therapeutic Exercise and NMR EXR  [x] (44165) Provided verbal/tactile cueing for activities related to strengthening, flexibility, endurance, ROM for improvements in  [] LE / Lumbar: LE, proximal hip, and core control with self care, mobility, lifting, ambulation. [x] UE / Cervical: cervical, postural, scapular, scapulothoracic and UE control with self care, reaching, carrying, lifting, house/yardwork, driving, computer work.  [] (91782) Provided verbal/tactile cueing for activities related to improving balance, coordination, kinesthetic sense, posture, motor skill, proprioception to assist with   [] LE / lumbar: LE, proximal hip, and core control in self care, mobility, lifting, ambulation and eccentric single leg control. [] UE / cervical: cervical, scapular, scapulothoracic and UE control with self care, reaching, carrying, lifting, house/yardwork, driving, computer work.   [] (58370) Therapist is in constant attendance of 2 or more patients providing skilled therapy interventions, but not providing any significant amount of measurable one-on-one time to either patient, for improvements in  [] LE / lumbar: LE, proximal hip, and core control in self care, mobility, lifting, ambulation and eccentric single leg control. [] UE / cervical: cervical, scapular, scapulothoracic and UE control with self care, reaching, carrying, lifting, house/yardwork, driving, computer work.      NMR and Therapeutic Activities:    [x] (20653 or 27618) Provided verbal/tactile cueing for activities related to improving balance, coordination, kinesthetic sense, posture, motor skill, proprioception and motor activation to allow for proper function of   [] LE: / Lumbar core, proximal hip and LE with self care and ADLs  [x] UE / Cervical: cervical, postural, scapular, scapulothoracic and UE control with self care, carrying, lifting, driving, computer work.   [] (96354) Gait Re-education- Provided training and instruction to the patient for proper LE, core and proximal hip recruitment and positioning and eccentric body weight control with ambulation re-education including up and down stairs     Home Management Training / Self Care:  [] (66637) Provided self-care/home management training related to activities of daily living and compensatory training, and/or use of adaptive equipment for improvement with: ADLs and compensatory training, meal preparation, safety procedures and instruction in use of adaptive equipment, including bathing, grooming, dressing, personal hygiene, basic household cleaning and chores.      Home Exercise Program:    [] (16634) Reviewed/Progressed HEP activities related to strengthening, flexibility, endurance, ROM of   [] LE / Lumbar: core, proximal hip and LE for functional self-care, mobility, lifting and ambulation/stair navigation   [] UE / Cervical: cervical, postural, scapular, scapulothoracic and UE control with self care, reaching, carrying, lifting, house/yardwork, driving, computer work  [] (17317)Reviewed/Progressed HEP activities related to improving balance, coordination, kinesthetic sense, posture, motor skill, proprioception of   [] LE: core, proximal hip and LE for self care, mobility, lifting, and ambulation/stair navigation    [] UE / Cervical: cervical, postural,  scapular, scapulothoracic and UE control with self care, reaching, carrying, lifting, house/yardwork, driving, computer work    Manual Treatments:  PROM / STM / Oscillations-Mobs:  G-I, II, III, IV (PA's, Inf., Post.)  [x] (17342) Provided manual therapy to mobilize LE, proximal hip and/or LS spine soft tissue/joints for the purpose of modulating pain, promoting relaxation,  increasing ROM, reducing/eliminating soft tissue swelling/inflammation/restriction, improving soft tissue extensibility and allowing for proper ROM for normal function with   [] LE / lumbar: self care, mobility, lifting and ambulation. [x] UE / Cervical: self care, reaching, carrying, lifting, house/yardwork, driving, computer work. Modalities:  [] (47188) Vasopneumatic compression: Utilized vasopneumatic compression to decrease edema / swelling for the purpose of improving mobility and quad tone / recruitment which will allow for increased overall function including but not limited to self-care, transfers, ambulation, and ascending / descending stairs. Charges:  Timed Code Treatment Minutes: 40   Total Treatment Minutes: 55     [] EVAL - LOW (37627)   [] EVAL - MOD (86845)  [] EVAL - HIGH (39466)  [] RE-EVAL (26273)  [x] CM(85063) x 1       [] Ionto  [x] NMR (08659) x 1      [] Vaso  [x] Manual (70376) x 1      [] Ultrasound  [] TA x        [] Mech Traction (88222)  [] Aquatic Therapy x                     [] ES (un) (98538):   [] Home Management Training x  [] ES(attended) (71432)   [] Dry Needling 1-2 muscles (31396):  [] Dry Needling 3+ muscles (273864)  [] Group:      [] Other:     GOALS:  Short Term Goals: To be achieved in: 2 weeks  1. Independent in HEP and progression per patient tolerance, in order to prevent re-injury. []? Progressing: []? Met: []? Not Met: []? Adjusted  2. Patient will have a decrease in pain to facilitate improvement in movement, function, and ADLs as indicated by Functional Deficits. []? Progressing: []? Met: []? Not Met: []? Adjusted     Long Term Goals: To be achieved in: 5 weeks  1. Disability index score of 10% or less for the QDASH to assist with reaching prior level of function. []? Progressing: []? Met: []? Not Met: []? Adjusted  2. Patient will demonstrate increased AROM in L GHJ to be equal or greater than ROM in R GHJ to allow for proper joint functioning as indicated by patients Functional Deficits. []? Progressing: []? Met: []? Not Met: []? Adjusted  3.  Patient will demonstrate an increase in postural awareness and control and activation of deep cervical stabilizers to allow for proper functional mobility as indicated by patients Functional Deficits. []? Progressing: []? Met: []? Not Met: []? Adjusted  4. Patient will demonstrate an increase in Strength in L GHJ to 5/5 to allow for proper functional mobility as indicated by patients Functional Deficits. []? Progressing: []? Met: []? Not Met: []? Adjusted    Overall Progression Towards Functional goals/ Treatment Progress Update:  [] Patient is progressing as expected towards functional goals listed. [] Progression is slowed due to complexities/Impairments listed. [] Progression has been slowed due to co-morbidities. [x] Plan just implemented, too soon to assess goals progression <30days   [] Goals require adjustment due to lack of progress  [] Patient is not progressing as expected and requires additional follow up with physician  [] Other    Persisting Functional Limitations/Impairments:  []Sleeping []Sitting               []Standing []Transfers        []Walking []Kneeling               []Stairs []Squatting / bending   []ADLs [x]Reaching  [x]Lifting  []Housework  [x]Driving []Job related tasks  []Sports/Recreation []Other:        ASSESSMENT:  Changed position for prone exercises to supported on SB and pt was able to perform with good form and less pain. Still has quarter sized trigger point in L UT, but does respond to manual techniques. Pt states she has increased muscle tone/tightnes after therex. Plan to continue progressing strength, mobility, and flexibility as able.      Treatment/Activity Tolerance:  [x] Patient able to complete tx  [] Patient limited by fatigue  [] Patient limited by pain  [] Patient limited by other medical complications  [] Other:     Prognosis: [x] Good [] Fair  [] Poor    Patient Requires Follow-up: [x] Yes  [] No    Plan for next treatment session:  Manual to L GHJ and thor spine for increased mobility, L GHJ strengthening, mid back strength, posture retraining, MOC     PLAN: See eval. PT 2x / week for 5 weeks. [x] Continue per plan of care [] Alter current plan (see comments)  [] Plan of care initiated [] Hold pending MD visit [] Discharge    Electronically signed by: Ching Aviles PT, DPT    Note: If patient does not return for scheduled/ recommended follow up visits, this note will serve as a discharge from care along with most recent update on progress.

## 2021-03-11 ENCOUNTER — HOSPITAL ENCOUNTER (OUTPATIENT)
Dept: PHYSICAL THERAPY | Age: 46
Setting detail: THERAPIES SERIES
Discharge: HOME | End: 2021-03-11
Payer: COMMERCIAL

## 2021-03-11 PROCEDURE — 97110 THERAPEUTIC EXERCISES: CPT

## 2021-03-11 PROCEDURE — 97140 MANUAL THERAPY 1/> REGIONS: CPT

## 2021-03-11 NOTE — FLOWSHEET NOTE
[address] [facility] Physical Therapy  Phone: [phone]   Fax: [phone]    Physical Therapy Daily Treatment Note  Date:  3/11/2021    Patient Name:  Kiran Cee    :  1975  MRN: [id number]  Medical/Treatment Diagnosis Information:  Diagnosis: low back pain (actually thor pain)  Treatment Diagnosis: decreased thor spine mobility, limited B GHJ AROM, decreased L GHJ strength, adhesions in soft tissue through scapulothoracic region on L  Insurance/Certification information:  PT Insurance Information: Aetna (med nec, no dry needling)  Physician Information:  Referring Practitioner: Reymundo Singleton MD  Plan of care signed (Y/N): []  Yes [x]  No     Date of Patient follow up with Physician: ?     Progress Report: []  Yes  [x]  No     Date Range for reporting period:  Beginnin21  Ending:     Progress report due (10 Rx/or 30 days whichever is less): visit #10 or      Recertification due (POC duration/ or 90 days whichever is less): visit #10 or 21     Visit # Insurance Allowable Auth required? Date Range   7/10 Med nec, NO DRY NEEDLING []  Yes  [x]  No n/a     Latex Allergy:  [x]NO      []YES  Preferred Language for Healthcare:   [x]English       []other:    Functional Scale:        Date assessed:  QDASH: raw score = 31; dysfunction = 45%                               21      Pain level:  5/10      SUBJECTIVE: Pt reports she is doing about the same.        OBJECTIVE:   : pt 15 min late       RESTRICTIONS/PRECAUTIONS:     Exercises/Interventions:     Therapeutic Exercises (15115) Resistance / level Sets/sec Reps Notes   Wand flexion     Wand abd     Sitting scap retract      UBE   2 min forward/ 2 min retro About 10 rpm   Cables:  Mid rows   LPD  D2 flexion   Shoulder adduction        Attempted on L but too painful    Pulleys:  Flexion  scaption           3 way wall star Green at wrists  1 x10 B     PREs:  Flexion to 90 deg  scaption to 90 deg     Free weights:  Bicep curl  B ER   3#  3#      3#  3#   1  1      1  1   x10  x10      x10  x10    Push up PLUS on wall   1 x10 Painful                                Therapeutic Activities (01454)                                          Neuromuscular Re-ed (71134)       Thor ext over bolster in sitting   Scap retract around bolster in sitting     Self foam roll to scap on wall   Rhomboid stretch and pressure with foam roll on wall     UT stretch   LS stretch     1/2 foam roll on wall:  GHJ alt flexion  GJH abd     1/2 foam roll along spine:  GHJ flexion  GHJ abd     Self trigger point release to L UT with theracane      Wall forearm slide     Prone:  Retraction  Ext  Row    SL scap retraction     Prove over SB:  GHJ ext  GHJ abd   Rows                             Manual Intervention ([phone])       DMT to B UTs and parapsinals, PA mobs to thor spine centrally and generally grade 2        DTM to B UT and LS in prone, hawkgrips #9 brushes and strums to B UT, levator scap   X 12 min     DTM and trigger point release to L UT                                Modalities:   3/11, 3/4, 2/27: MHP to cerv spine and thor spine in supine with warm blanket x 15 min   2/23: MHP to cerv spine and thor spine in supine with towel roll under head x 10 min     Pt. Education:  2/16: patient educated on diagnosis, prognosis and expectations for rehab, all patient questions were answered    Home Exercise Program:  Access Code: [email or web address]  URL: [email or web address]  Date: 02/27/2021  Prepared by: Anne Euceda    Exercises  Seated Cervical Sidebending Stretch - 10 reps - 3 sets - 1x daily - 7x weekly  Seated Levator Scapulae Stretch - 10 reps - 3 sets - 1x daily - 7x weekly    Access Code: [facility]   URL: [employer]/   Date: 02/16/2021   Prepared by: Anne Euceda     Exercises   Supine Shoulder Flexion with Dowel - 10 reps - 3 sets - 1x daily - 7x weekly   Supine Shoulder Abduction AAROM with Dowel - 10 reps - 3 sets - 1x daily - 7x weekly   Seated Scapular Retraction - 10 reps - 3 sets - 1x daily - 7x weekly         Therapeutic Exercise and NMR EXR  [x] (82665) Provided verbal/tactile cueing for activities related to strengthening, flexibility, endurance, ROM for improvements in  [] LE / Lumbar: LE, proximal hip, and core control with self care, mobility, lifting, ambulation. [x] UE / Cervical: cervical, postural, scapular, scapulothoracic and UE control with self care, reaching, carrying, lifting, house/yardwork, driving, computer work.  [] (49372) Provided verbal/tactile cueing for activities related to improving balance, coordination, kinesthetic sense, posture, motor skill, proprioception to assist with   [] LE / lumbar: LE, proximal hip, and core control in self care, mobility, lifting, ambulation and eccentric single leg control. [] UE / cervical: cervical, scapular, scapulothoracic and UE control with self care, reaching, carrying, lifting, house/yardwork, driving, computer work.   [] (79121) Therapist is in constant attendance of 2 or more patients providing skilled therapy interventions, but not providing any significant amount of measurable one-on-one time to either patient, for improvements in  [] LE / lumbar: LE, proximal hip, and core control in self care, mobility, lifting, ambulation and eccentric single leg control. [] UE / cervical: cervical, scapular, scapulothoracic and UE control with self care, reaching, carrying, lifting, house/yardwork, driving, computer work.      NMR and Therapeutic Activities:    [] (08356 or 11806) Provided verbal/tactile cueing for activities related to improving balance, coordination, kinesthetic sense, posture, motor skill, proprioception and motor activation to allow for proper function of   [] LE: / Lumbar core, proximal hip and LE with self care and ADLs  [] UE / Cervical: cervical, postural, scapular, scapulothoracic and UE control with self care, carrying, lifting, driving, computer work.   [] (08769) Gait Re-education- Provided training and instruction to the patient for proper LE, core and proximal hip recruitment and positioning and eccentric body weight control with ambulation re-education including up and down stairs     Home Management Training / Self Care:  [] (41745) Provided self-care/home management training related to activities of daily living and compensatory training, and/or use of adaptive equipment for improvement with: ADLs and compensatory training, meal preparation, safety procedures and instruction in use of adaptive equipment, including bathing, grooming, dressing, personal hygiene, basic household cleaning and chores.      Home Exercise Program:    [] (08488) Reviewed/Progressed HEP activities related to strengthening, flexibility, endurance, ROM of   [] LE / Lumbar: core, proximal hip and LE for functional self-care, mobility, lifting and ambulation/stair navigation   [] UE / Cervical: cervical, postural, scapular, scapulothoracic and UE control with self care, reaching, carrying, lifting, house/yardwork, driving, computer work  [] (08630)Reviewed/Progressed HEP activities related to improving balance, coordination, kinesthetic sense, posture, motor skill, proprioception of   [] LE: core, proximal hip and LE for self care, mobility, lifting, and ambulation/stair navigation    [] UE / Cervical: cervical, postural,  scapular, scapulothoracic and UE control with self care, reaching, carrying, lifting, house/yardwork, driving, computer work    Manual Treatments:  PROM / STM / Oscillations-Mobs:  G-I, II, III, IV (PA's, Inf., Post.)  [x] (14682) Provided manual therapy to mobilize LE, proximal hip and/or LS spine soft tissue/joints for the purpose of modulating pain, promoting relaxation,  increasing ROM, reducing/eliminating soft tissue swelling/inflammation/restriction, improving soft tissue extensibility and allowing for proper ROM for normal function with   [] LE / lumbar: self care, mobility, lifting and ambulation. [x] UE / Cervical: self care, reaching, carrying, lifting, house/yardwork, driving, computer work. Modalities:  [] (73863) Vasopneumatic compression: Utilized vasopneumatic compression to decrease edema / swelling for the purpose of improving mobility and quad tone / recruitment which will allow for increased overall function including but not limited to self-care, transfers, ambulation, and ascending / descending stairs. Charges:  Timed Code Treatment Minutes: 43   Total Treatment Minutes: 58     [] EVAL - LOW (94337)   [] EVAL - MOD (64497)  [] EVAL - HIGH (94381)  [] RE-EVAL (87663)  [x] WB(52578) x 2       [] Ionto  [] NMR (63561) x       [] Vaso  [x] Manual (17919) x 1      [] Ultrasound  [] TA x        [] Mech Traction (08932)  [] Aquatic Therapy x                     [] ES (un) (18653):   [] Home Management Training x  [] ES(attended) (31959)   [] Dry Needling 1-2 muscles (05097):  [] Dry Needling 3+ muscles (661720)  [] Group:      [] Other:     GOALS:  Short Term Goals: To be achieved in: 2 weeks  1. Independent in HEP and progression per patient tolerance, in order to prevent re-injury. []? Progressing: []? Met: []? Not Met: []? Adjusted  2. Patient will have a decrease in pain to facilitate improvement in movement, function, and ADLs as indicated by Functional Deficits. []? Progressing: []? Met: []? Not Met: []? Adjusted     Long Term Goals: To be achieved in: 5 weeks  1. Disability index score of 10% or less for the QDASH to assist with reaching prior level of function. []? Progressing: []? Met: []? Not Met: []? Adjusted  2. Patient will demonstrate increased AROM in L GHJ to be equal or greater than ROM in R GHJ to allow for proper joint functioning as indicated by patients Functional Deficits. []? Progressing: []? Met: []? Not Met: []? Adjusted  3.  Patient will demonstrate an increase in postural awareness and control and activation of deep cervical stabilizers to allow for proper functional mobility as indicated by patients Functional Deficits. []? Progressing: []? Met: []? Not Met: []? Adjusted  4. Patient will demonstrate an increase in Strength in L GHJ to 5/5 to allow for proper functional mobility as indicated by patients Functional Deficits. []? Progressing: []? Met: []? Not Met: []? Adjusted    Overall Progression Towards Functional goals/ Treatment Progress Update:  [] Patient is progressing as expected towards functional goals listed. [] Progression is slowed due to complexities/Impairments listed. [] Progression has been slowed due to co-morbidities. [x] Plan just implemented, too soon to assess goals progression <30days   [] Goals require adjustment due to lack of progress  [] Patient is not progressing as expected and requires additional follow up with physician  [] Other    Persisting Functional Limitations/Impairments:  []Sleeping []Sitting               []Standing []Transfers        []Walking []Kneeling               []Stairs []Squatting / bending   []ADLs [x]Reaching  [x]Lifting  []Housework  [x]Driving []Job related tasks  []Sports/Recreation []Other:        ASSESSMENT:  Decreased tissue tension in B UTs, no longer large trigger points. Limited with therex due to pain. Plan to continue with manual and strengthening as able to reach goals. Treatment/Activity Tolerance:  [] Patient able to complete tx  [] Patient limited by fatigue  [x] Patient limited by pain  [] Patient limited by other medical complications  [] Other:     Prognosis: [x] Good [] Fair  [] Poor    Patient Requires Follow-up: [x] Yes  [] No    Plan for next treatment session:  Manual to L GHJ and thor spine for increased mobility, L GHJ strengthening, mid back strength, posture retraining, MOC     PLAN: See eval. PT 2x / week for 5 weeks.    [x] Continue per plan of care [] Alter current plan (see comments)  [] Plan of care initiated [] Hold pending MD visit [] Discharge    Electronically signed by: Marie Serrano PT, DPT    Note: If patient does not return for scheduled/ recommended follow up visits, this note will serve as a discharge from care along with most recent update on progress.

## 2021-03-16 ENCOUNTER — HOSPITAL ENCOUNTER (OUTPATIENT)
Dept: PHYSICAL THERAPY | Age: 46
Setting detail: THERAPIES SERIES
Discharge: HOME | End: 2021-03-16
Payer: COMMERCIAL

## 2021-03-16 PROCEDURE — 97110 THERAPEUTIC EXERCISES: CPT

## 2021-03-16 PROCEDURE — 97140 MANUAL THERAPY 1/> REGIONS: CPT

## 2021-03-16 NOTE — PROGRESS NOTES
Abd 115 145     Shoulder ER 50 62     Shoulder IR 20 42 Painful on L when moving actively      Strength LEFT RIGHT Comments   Shoulder flexion 4+ 5     Shoulder scaption 4+ 5     Shoulder ER 4+ 4+     Shoulder IR 4+ 5     Biceps 5 5     Triceps 5 5        2/23: pt 15 min late       RESTRICTIONS/PRECAUTIONS:     Exercises/Interventions:     Therapeutic Exercises (82862) Resistance / level Sets/sec Reps Notes   Wand flexion     Wand abd     Sitting scap retract      UBE    About 10 rpm   Cables:  Mid rows   LPD  D2 flexion   Shoulder adduction        Attempted on L but too painful    Pulleys:  Flexion  scaption     X 2 min  X 2 min    3 way wall star    PREs:  Flexion to 90 deg  scaption to 90 deg     Free weights:  Bicep curl  B ER  OH press   tricep kick back    3#  3#        3#  3#   1  1        1  1   x10  x10        x10  x10    Push up PLUS on wall   Painful    Doorway stretch   30 sec x3                         Therapeutic Activities (11034)                                          Neuromuscular Re-ed (07306)       Thor ext over bolster in sitting   Scap retract around bolster in sitting     Self foam roll to scap on wall   Rhomboid stretch and pressure with foam roll on wall     UT stretch   LS stretch     1/2 foam roll on wall:  GHJ alt flexion  GJH abd     1/2 foam roll along spine:  GHJ flexion  GHJ abd     Self trigger point release to L UT with theracane      Wall forearm slide     Prone:  Retraction  Ext  Row    SL scap retraction     Prove over SB:  GHJ ext  GHJ abd   Rows                             Manual Intervention ([phone])       DMT to B UTs and parapsinals, PA mobs to thor spine centrally and generally grade 2        DTM to B UT and LS in prone, zacheryritheodore #9 brushes and strums to B UT, levator scap   X 10 min     DTM and trigger point release to L UT                                Modalities:   3/11, 3/4, 2/27: MHP to cerv spine and thor spine in supine with warm blanket x 15 min   2/23: MHP to cerv spine and thor spine in supine with towel roll under head x 10 min     Pt. Education:    3/16: Reassessed goals, discussed progress made and areas remaining for improvement, advised pt to continue with thor ext to increase mobility in thor spine   2/16: patient educated on diagnosis, prognosis and expectations for rehab, all patient questions were answered    Home Exercise Program:  Access Code: [email or web address]  URL: [email or web address]  Date: 02/27/2021  Prepared by: Stone Rowe    Exercises  Seated Cervical Sidebending Stretch - 10 reps - 3 sets - 1x daily - 7x weekly  Seated Levator Scapulae Stretch - 10 reps - 3 sets - 1x daily - 7x weekly    Access Code: [facility]   URL: [email or web address]/   Date: 02/16/2021   Prepared by: Stone Rowe     Exercises   Supine Shoulder Flexion with Dowel - 10 reps - 3 sets - 1x daily - 7x weekly   Supine Shoulder Abduction AAROM with Dowel - 10 reps - 3 sets - 1x daily - 7x weekly   Seated Scapular Retraction - 10 reps - 3 sets - 1x daily - 7x weekly         Therapeutic Exercise and NMR EXR  [x] (74786) Provided verbal/tactile cueing for activities related to strengthening, flexibility, endurance, ROM for improvements in  [] LE / Lumbar: LE, proximal hip, and core control with self care, mobility, lifting, ambulation. [x] UE / Cervical: cervical, postural, scapular, scapulothoracic and UE control with self care, reaching, carrying, lifting, house/yardwork, driving, computer work.  [] (85158) Provided verbal/tactile cueing for activities related to improving balance, coordination, kinesthetic sense, posture, motor skill, proprioception to assist with   [] LE / lumbar: LE, proximal hip, and core control in self care, mobility, lifting, ambulation and eccentric single leg control.    [] UE / cervical: cervical, scapular, scapulothoracic and UE control with self care, reaching, carrying, lifting, house/yardwork, driving, computer work.   [] (23573) Therapist is in constant attendance of 2 or more patients providing skilled therapy interventions, but not providing any significant amount of measurable one-on-one time to either patient, for improvements in  [] LE / lumbar: LE, proximal hip, and core control in self care, mobility, lifting, ambulation and eccentric single leg control. [] UE / cervical: cervical, scapular, scapulothoracic and UE control with self care, reaching, carrying, lifting, house/yardwork, driving, computer work. NMR and Therapeutic Activities:    [] (60486 or 08939) Provided verbal/tactile cueing for activities related to improving balance, coordination, kinesthetic sense, posture, motor skill, proprioception and motor activation to allow for proper function of   [] LE: / Lumbar core, proximal hip and LE with self care and ADLs  [] UE / Cervical: cervical, postural, scapular, scapulothoracic and UE control with self care, carrying, lifting, driving, computer work.   [] (94787) Gait Re-education- Provided training and instruction to the patient for proper LE, core and proximal hip recruitment and positioning and eccentric body weight control with ambulation re-education including up and down stairs     Home Management Training / Self Care:  [] (18173) Provided self-care/home management training related to activities of daily living and compensatory training, and/or use of adaptive equipment for improvement with: ADLs and compensatory training, meal preparation, safety procedures and instruction in use of adaptive equipment, including bathing, grooming, dressing, personal hygiene, basic household cleaning and chores.      Home Exercise Program:    [] (90865) Reviewed/Progressed HEP activities related to strengthening, flexibility, endurance, ROM of   [] LE / Lumbar: core, proximal hip and LE for functional self-care, mobility, lifting and ambulation/stair navigation   [] UE / Cervical: cervical, postural, scapular, scapulothoracic and UE control with self care, reaching, carrying, lifting, house/yardwork, driving, computer work  [] (62331)Reviewed/Progressed HEP activities related to improving balance, coordination, kinesthetic sense, posture, motor skill, proprioception of   [] LE: core, proximal hip and LE for self care, mobility, lifting, and ambulation/stair navigation    [] UE / Cervical: cervical, postural,  scapular, scapulothoracic and UE control with self care, reaching, carrying, lifting, house/yardwork, driving, computer work    Manual Treatments:  PROM / STM / Oscillations-Mobs:  G-I, II, III, IV (PA's, Inf., Post.)  [x] (95467) Provided manual therapy to mobilize LE, proximal hip and/or LS spine soft tissue/joints for the purpose of modulating pain, promoting relaxation,  increasing ROM, reducing/eliminating soft tissue swelling/inflammation/restriction, improving soft tissue extensibility and allowing for proper ROM for normal function with   [] LE / lumbar: self care, mobility, lifting and ambulation. [x] UE / Cervical: self care, reaching, carrying, lifting, house/yardwork, driving, computer work. Modalities:  [] (42743) Vasopneumatic compression: Utilized vasopneumatic compression to decrease edema / swelling for the purpose of improving mobility and quad tone / recruitment which will allow for increased overall function including but not limited to self-care, transfers, ambulation, and ascending / descending stairs.      Charges:  Timed Code Treatment Minutes: 45   Total Treatment Minutes: 45     [] EVAL - LOW (42164)   [] EVAL - MOD (32217)  [] EVAL - HIGH (97986)  [] RE-EVAL (74951)  [x] EM(68509) x 2       [] Ionto  [] NMR (12642) x       [] Vaso  [x] Manual (09680) x 1      [] Ultrasound  [] TA x        [] Mech Traction (19032)  [] Aquatic Therapy x                     [] ES (un) (14483):   [] Home Management Training x  [] ES(attended) (22508)   [] Dry Needling 1-2 muscles (15050):  [] Dry Needling 3+ muscles (138287)  [] Group: Limitations/Impairments:  []Sleeping []Sitting               []Standing []Transfers        []Walking []Kneeling               []Stairs []Squatting / bending   []ADLs [x]Reaching  [x]Lifting  []Housework  [x]Driving []Job related tasks  []Sports/Recreation []Other:        ASSESSMENT:  Pt is progressing towards goals, but has not met most of them. Cerv AROM more limited than at eval into ext and side bending. Trigger points in B UTs dissipated. May need additional visits. Treatment/Activity Tolerance:  [] Patient able to complete tx  [] Patient limited by fatigue  [x] Patient limited by pain  [] Patient limited by other medical complications  [] Other:     Prognosis: [x] Good [] Fair  [] Poor    Patient Requires Follow-up: [x] Yes  [] No    Plan for next treatment session:  Manual to L GHJ and thor spine for increased mobility, L GHJ strengthening, mid back strength, posture retraining, MOC     PLAN: See eval. PT 2x / week for 5 weeks. [x] Continue per plan of care [] Alter current plan (see comments)  [] Plan of care initiated [] Hold pending MD visit [] Discharge    Electronically signed by: Yamile Bermudez PT, DPT    Note: If patient does not return for scheduled/ recommended follow up visits, this note will serve as a discharge from care along with most recent update on progress.

## 2021-03-18 ENCOUNTER — HOSPITAL ENCOUNTER (OUTPATIENT)
Dept: PHYSICAL THERAPY | Age: 46
Setting detail: THERAPIES SERIES
Discharge: HOME | End: 2021-03-18
Payer: COMMERCIAL

## 2021-03-18 PROCEDURE — 97110 THERAPEUTIC EXERCISES: CPT

## 2021-03-18 NOTE — FLOWSHEET NOTE
[address] [facility]  Phone: [phone]   Fax: [phone]    Physical Therapy Daily Treatment Note  Date:  3/18/2021    Patient Name:  Filippo Lovelace    :  1975  MRN: [id number]  Medical/Treatment Diagnosis Information:  Diagnosis: low back pain (actually thor pain)  Treatment Diagnosis: decreased thor spine mobility, limited B GHJ AROM, decreased L GHJ strength, adhesions in soft tissue through scapulothoracic region on L  Insurance/Certification information:  PT Insurance Information: [employer] (med nec, no dry needling)  Physician Information:  Referring Practitioner: Shahnaz Tabares MD  Plan of care signed (Y/N): []  Yes [x]  No     Date of Patient follow up with Physician: ?     Progress Report: []  Yes  [x]  No     Date Range for reporting period:  Beginnin21   Ending: 3/16/21    Progress report due (10 Rx/or 30 days whichever is less): visit #10 or      Recertification due (POC duration/ or 90 days whichever is less): visit #10 or 21     Visit # Insurance Allowable Auth required? Date Range   9/10 Med nec, NO DRY NEEDLING []  Yes  [x]  No n/a     Latex Allergy:  [x]NO      []YES  Preferred Language for Healthcare:   [x]English       []other:    Functional Scale:        Date assessed:  QDASH: raw score = 31; dysfunction = 45%                               21  QDASH: raw score = 28; dysfunction = 38%                               3/16/21    Pain level:  5/10      SUBJECTIVE: Pt reports she is feeling okay. Having pain in L UT and R mid back.         OBJECTIVE:   3/16:     ROM   Comments   Cervical Flexion 45 deg     Cervical Extension 35 deg             Lumbar Flexion 70 deg     Lumbar Extension 15 deg        ROM LEFT RIGHT Comments   Cervical Side Bend 40 deg  40 deg     Cervical Rotation         Shoulder Flex 130 125     Shoulder Abd 115 145     Shoulder ER 50 62     Shoulder IR 20 42 Painful on L when moving actively Strength LEFT RIGHT Comments   Shoulder flexion 4+ 5     Shoulder scaption 4+ 5     Shoulder ER 4+ 4+     Shoulder IR 4+ 5     Biceps 5 5     Triceps 5 5        2/23: pt 15 min late       RESTRICTIONS/PRECAUTIONS:     Exercises/Interventions:     Therapeutic Exercises (70308) Resistance / level Sets/sec Reps Notes   Wand flexion    Wand abd    Sitting scap retract     UBE About 10 rpm   Cables:  Mid rows   LPD  D2 flexion   Shoulder adduction        Attempted on L but too painful    Pulleys:  Flexion  scaption     3 way wall star    PREs:  Flexion to 90 deg  scaption to 90 deg     Free weights:  Bicep curl  B ER  OH press   tricep kick back     Push up PLUS on wall   Painful    Doorway stretch      [facility]:  tricep push down  Chest press   shoulder press   LPD  TRX mid rows   flys    10#  10#  10#  20#    10#   2  2  2  2  2  2   x10  x10  x10  x10  x10  x10                  Therapeutic Activities (76324)                                          Neuromuscular Re-ed (54706)       Thor ext over bolster in sitting   Scap retract around bolster in sitting     Self foam roll to scap on wall   Rhomboid stretch and pressure with foam roll on wall     UT stretch   LS stretch     1/2 foam roll on wall:  GHJ alt flexion  GJH abd     1/2 foam roll along spine:  GHJ flexion  GHJ abd     Self trigger point release to L UT with theracane      Wall forearm slide     Prone:  Retraction  Ext  Row    SL scap retraction     Prove over SB:  GHJ ext  GHJ abd   Rows                             Manual Intervention ([id number])       DMT to B UTs and parapsinals, PA mobs to thor spine centrally and generally grade 2        DTM to B UT and LS in prone, branden #9 brushes and strums to B UT, levator scap   X 10 min     DTM and trigger point release to L UT                                Modalities:   3/18, 3/11, 3/4, 2/27: MHP to cerv spine and thor spine in supine with warm blanket x 15 min   2/23: MHP to cerv spine and thor spine in supine with towel roll under head x 10 min     Pt. Education:    3/16: Reassessed goals, discussed progress made and areas remaining for improvement, advised pt to continue with thor ext to increase mobility in thor spine   2/16: patient educated on diagnosis, prognosis and expectations for rehab, all patient questions were answered    Home Exercise Program:  Access Code: [email or web address]  URL: [email or web address]  Date: 02/27/2021  Prepared by: Luca Pickup    Exercises  Seated Cervical Sidebending Stretch - 10 reps - 3 sets - 1x daily - 7x weekly  Seated Levator Scapulae Stretch - 10 reps - 3 sets - 1x daily - 7x weekly    Access Code: [facility]   URL: [email or web address]   Date: 02/16/2021   Prepared by: Luca Pickup     Exercises   Supine Shoulder Flexion with Dowel - 10 reps - 3 sets - 1x daily - 7x weekly   Supine Shoulder Abduction AAROM with Dowel - 10 reps - 3 sets - 1x daily - 7x weekly   Seated Scapular Retraction - 10 reps - 3 sets - 1x daily - 7x weekly         Therapeutic Exercise and NMR EXR  [x] (07783) Provided verbal/tactile cueing for activities related to strengthening, flexibility, endurance, ROM for improvements in  [] LE / Lumbar: LE, proximal hip, and core control with self care, mobility, lifting, ambulation. [x] UE / Cervical: cervical, postural, scapular, scapulothoracic and UE control with self care, reaching, carrying, lifting, house/yardwork, driving, computer work.  [] (22093) Provided verbal/tactile cueing for activities related to improving balance, coordination, kinesthetic sense, posture, motor skill, proprioception to assist with   [] LE / lumbar: LE, proximal hip, and core control in self care, mobility, lifting, ambulation and eccentric single leg control.    [] UE / cervical: cervical, scapular, scapulothoracic and UE control with self care, reaching, carrying, lifting, house/yardwork, driving, computer work.   [] (63801) Therapist is in constant attendance of 2 or more patients providing skilled therapy interventions, but not providing any significant amount of measurable one-on-one time to either patient, for improvements in  [] LE / lumbar: LE, proximal hip, and core control in self care, mobility, lifting, ambulation and eccentric single leg control. [] UE / cervical: cervical, scapular, scapulothoracic and UE control with self care, reaching, carrying, lifting, house/yardwork, driving, computer work. NMR and Therapeutic Activities:    [] (52511 or 57235) Provided verbal/tactile cueing for activities related to improving balance, coordination, kinesthetic sense, posture, motor skill, proprioception and motor activation to allow for proper function of   [] LE: / Lumbar core, proximal hip and LE with self care and ADLs  [] UE / Cervical: cervical, postural, scapular, scapulothoracic and UE control with self care, carrying, lifting, driving, computer work.   [] (11012) Gait Re-education- Provided training and instruction to the patient for proper LE, core and proximal hip recruitment and positioning and eccentric body weight control with ambulation re-education including up and down stairs     Home Management Training / Self Care:  [] (19306) Provided self-care/home management training related to activities of daily living and compensatory training, and/or use of adaptive equipment for improvement with: ADLs and compensatory training, meal preparation, safety procedures and instruction in use of adaptive equipment, including bathing, grooming, dressing, personal hygiene, basic household cleaning and chores.      Home Exercise Program:    [] (66979) Reviewed/Progressed HEP activities related to strengthening, flexibility, endurance, ROM of   [] LE / Lumbar: core, proximal hip and LE for functional self-care, mobility, lifting and ambulation/stair navigation   [] UE / Cervical: cervical, postural, scapular, scapulothoracic and UE control with self care, reaching, carrying, lifting, house/yardwork, driving, computer work  [] (98346)Reviewed/Progressed HEP activities related to improving balance, coordination, kinesthetic sense, posture, motor skill, proprioception of   [] LE: core, proximal hip and LE for self care, mobility, lifting, and ambulation/stair navigation    [] UE / Cervical: cervical, postural,  scapular, scapulothoracic and UE control with self care, reaching, carrying, lifting, house/yardwork, driving, computer work    Manual Treatments:  PROM / STM / Oscillations-Mobs:  G-I, II, III, IV (PA's, Inf., Post.)  [] (81374) Provided manual therapy to mobilize LE, proximal hip and/or LS spine soft tissue/joints for the purpose of modulating pain, promoting relaxation,  increasing ROM, reducing/eliminating soft tissue swelling/inflammation/restriction, improving soft tissue extensibility and allowing for proper ROM for normal function with   [] LE / lumbar: self care, mobility, lifting and ambulation. [] UE / Cervical: self care, reaching, carrying, lifting, house/yardwork, driving, computer work. Modalities:  [] (86603) Vasopneumatic compression: Utilized vasopneumatic compression to decrease edema / swelling for the purpose of improving mobility and quad tone / recruitment which will allow for increased overall function including but not limited to self-care, transfers, ambulation, and ascending / descending stairs.      Charges:  Timed Code Treatment Minutes: 38   Total Treatment Minutes: 48     [] EVAL - LOW (96970)   [] EVAL - MOD (15360)  [] EVAL - HIGH (24419)  [] RE-EVAL (46805)  [x] OA(78730) x 3       [] Ionto  [] NMR (19819) x       [] Vaso  [] Manual (16809)          [] Ultrasound  [] TA x        [] Mech Traction (71057)  [] Aquatic Therapy x                     [] ES (un) (62483):   [] Home Management Training x  [] ES(attended) (73996)   [] Dry Needling 1-2 muscles (00922):  [] Dry Needling 3+ muscles (838439)  [] Group:      [] Other: GOALS:  Short Term Goals: To be achieved in: 2 weeks  1. Independent in HEP and progression per patient tolerance, in order to prevent re-injury. []? Progressing: [x]? Met: []? Not Met: []? Adjusted  2. Patient will have a decrease in pain to facilitate improvement in movement, function, and ADLs as indicated by Functional Deficits. [x]? Progressing: []? Met: []? Not Met: []? Adjusted     Long Term Goals: To be achieved in: 5 weeks  1. Disability index score of 10% or less for the QDASH to assist with reaching prior level of function. [x]? Progressing: []? Met: []? Not Met: []? Adjusted  2. Patient will demonstrate increased AROM in L GHJ to be equal or greater than ROM in R GHJ to allow for proper joint functioning as indicated by patients Functional Deficits. [x]? Progressing: []? Met: []? Not Met: []? Adjusted  3. Patient will demonstrate an increase in postural awareness and control and activation of deep cervical stabilizers to allow for proper functional mobility as indicated by patients Functional Deficits. []? Progressing: [x]? Met: []? Not Met: []? Adjusted  4. Patient will demonstrate an increase in Strength in L GHJ to 5/5 to allow for proper functional mobility as indicated by patients Functional Deficits. [x]? Progressing: []? Met: []? Not Met: []? Adjusted    Overall Progression Towards Functional goals/ Treatment Progress Update:  [] Patient is progressing as expected towards functional goals listed. [] Progression is slowed due to complexities/Impairments listed. [] Progression has been slowed due to co-morbidities.   [x] Plan just implemented, too soon to assess goals progression <30days   [] Goals require adjustment due to lack of progress  [] Patient is not progressing as expected and requires additional follow up with physician  [] Other    Persisting Functional Limitations/Impairments:  []Sleeping []Sitting               []Standing []Transfers        []Walking []Kneeling               []Stairs []Squatting / bending   []ADLs [x]Reaching  [x]Lifting  []Housework  [x]Driving []Job related tasks  []Sports/Recreation []Other:        ASSESSMENT:  Pt able to complete therex in [facility], but limited with weight. Consider extending visits at next visit so pt can reach all goals. Treatment/Activity Tolerance:  [] Patient able to complete tx  [] Patient limited by fatigue  [x] Patient limited by pain  [] Patient limited by other medical complications  [] Other:     Prognosis: [x] Good [] Fair  [] Poor    Patient Requires Follow-up: [x] Yes  [] No    Plan for next treatment session:  Manual to L GHJ and thor spine for increased mobility, L GHJ strengthening, mid back strength, posture retraining, MOC     PLAN: See yara. PT 2x / week for 5 weeks. [x] Continue per plan of care [] Alter current plan (see comments)  [] Plan of care initiated [] Hold pending MD visit [] Discharge    Electronically signed by: Rhea Vargas PT, DPT    Note: If patient does not return for scheduled/ recommended follow up visits, this note will serve as a discharge from care along with most recent update on progress.

## 2021-03-23 ENCOUNTER — HOSPITAL ENCOUNTER (OUTPATIENT)
Dept: PHYSICAL THERAPY | Age: 46
Setting detail: THERAPIES SERIES
Discharge: HOME | End: 2021-03-23
Payer: COMMERCIAL

## 2021-03-23 PROCEDURE — 97110 THERAPEUTIC EXERCISES: CPT

## 2021-03-23 PROCEDURE — 97140 MANUAL THERAPY 1/> REGIONS: CPT
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
## 2021-03-26 ENCOUNTER — IMMUNIZATION (OUTPATIENT)
Dept: PRIMARY CARE CLINIC | Age: 46
End: 2021-03-26
Payer: COMMERCIAL

## 2021-03-26 PROCEDURE — 91301 COVID-19, MODERNA VACCINE 100MCG/0.5ML DOSE: CPT | Performed by: FAMILY MEDICINE

## 2021-03-26 PROCEDURE — 0011A COVID-19, MODERNA VACCINE 100MCG/0.5ML DOSE: CPT | Performed by: FAMILY MEDICINE

## 2021-04-06 ENCOUNTER — HOSPITAL ENCOUNTER (OUTPATIENT)
Dept: PHYSICAL THERAPY | Age: 46
Setting detail: THERAPIES SERIES
Discharge: HOME | End: 2021-04-06
Payer: COMMERCIAL

## 2021-04-06 PROCEDURE — 97112 NEUROMUSCULAR REEDUCATION: CPT

## 2021-04-06 PROCEDURE — 97110 THERAPEUTIC EXERCISES: CPT

## 2021-04-06 PROCEDURE — 97140 MANUAL THERAPY 1/> REGIONS: CPT

## 2021-04-06 NOTE — FLOWSHEET NOTE
[address] [facility]  Phone: [phone]   Fax: [phone]        Physical Therapy Daily Treatment Note  Date:  2021    Patient Name:  London Candelario    :  1975  MRN: [id number]  Medical/Treatment Diagnosis Information:  Diagnosis: low back pain (actually thor pain)  Treatment Diagnosis: decreased thor spine mobility, limited B GHJ AROM, decreased L GHJ strength, adhesions in soft tissue through scapulothoracic region on L  Insurance/Certification information:  PT Insurance Information: Aetna (med nec, no dry needling)  Physician Information:  Referring Practitioner: Harleen Barraza MD  Plan of care signed (Y/N): []  Yes [x]  No     Date of Patient follow up with Physician: ?     Progress Report: []  Yes  [x]  No     Date Range for reporting period:  Beginning: 3/16/21   Ending: 3/23/21    Progress report due (10 Rx/or 30 days whichever is less): visit 16 or      Recertification due (POC duration/ or 90 days whichever is less): visit #16 or 21     Visit # Insurance Allowable Auth required? Date Range   10/10 +  Med nec, NO DRY NEEDLING []  Yes  [x]  No n/a     Latex Allergy:  [x]NO      []YES  Preferred Language for Healthcare:   [x]English       []other:    Functional Scale:        Date assessed:  QDASH: raw score = 31; dysfunction = 45%                               21  QDASH: raw score = 28; dysfunction = 38%                               3/16/21    Pain level:  5/10      SUBJECTIVE: Pt reports she has been feeling okay - better overall. Shoulder feels the same, but back is improved. Son was home so she was having him rub some pain cream on the areas she couldn't reach.         OBJECTIVE:   3/23:  ROM   Comments   Cervical Flexion 55 deg     Cervical Extension 40 deg             Lumbar Flexion 70 deg     Lumbar Extension 15 deg        ROM LEFT RIGHT Comments   Cervical Side Bend 30 deg  40 deg     Cervical Rotation       Shoulder Flex 130 120     Shoulder Abd 115 160     Shoulder ER 50 62     Shoulder IR 50 50              Lumbar Side Bend 10 deg 10 deg     Lumbar Rotation         Hip Flexion         Hip Abd         Hip ER         Hip IR               Strength LEFT RIGHT Comments   Shoulder flexion 5 4+     Shoulder scaption 5 4+     Shoulder ER 5 4+     Shoulder IR 5 4+     Biceps 5 5     Triceps 5 5                              3/16:     ROM   Comments   Cervical Flexion 45 deg     Cervical Extension 35 deg             Lumbar Flexion 70 deg     Lumbar Extension 15 deg        ROM LEFT RIGHT Comments   Cervical Side Bend 40 deg  40 deg     Cervical Rotation         Shoulder Flex 130 125     Shoulder Abd 115 145     Shoulder ER 50 62     Shoulder IR 20 42 Painful on L when moving actively      Strength LEFT RIGHT Comments   Shoulder flexion 4+ 5     Shoulder scaption 4+ 5     Shoulder ER 4+ 4+     Shoulder IR 4+ 5     Biceps 5 5     Triceps 5 5        2/23: pt 15 min late       RESTRICTIONS/PRECAUTIONS:     Exercises/Interventions:     Therapeutic Exercises (44120) Resistance / level Sets/sec Reps Notes   Wand flexion    Wand abd    Sitting scap retract     UBE   About 10 rpm   Cables:  Mid rows   LPD  High rows   D2 flexion   D2 extension   Shoulder adduction   Lawn mower    35#  35#  35#    15#       2  2  2    2       x10  x10  x10     x10                 Pulleys:  Flexion  scaption  X 2 min  X 2 min   3 way wall star    PREs:  Flexion to 90 deg  scaption to 90 deg     Free weights:  Bicep curl  B ER  OH press   tricep kick back     Push up PLUS on wall   Painful    Doorway stretch      [employer]:  tricep push down  Chest press   shoulder press   LPD  TRX mid rows   flys                   Therapeutic Activities (03109)                                          Neuromuscular Re-ed (20260)       Thor ext over bolster in sitting   Scap retract around bolster in sitting     Self foam roll to scap on wall   Rhomboid stretch and pressure with foam roll on wall     UT stretch   LS stretch     1/2 foam roll on wall:  GHJ alt flexion  GJH abd     1/2 foam roll along spine:  GHJ flexion  GHJ abd     Self trigger point release to L UT with theracane      Wall forearm slide     Prone:  Retraction  Ext  Row    SL scap retraction     Prove over SB:  GHJ ext  GHJ abd   Rows        Thread the needle   Push up on ballet barre  Lat stretch       2  1 min   15  x2    Full foam roll in supine with :  GHJ flexion  GHJ abd      3 min    3 min    x1  x1           Manual Intervention (72330)       DMT to B UTs and parapsinals, PA mobs to thor spine centrally and generally grade 2        DTM to B UT and LS in prone, hawkgrips #9 brushes and strums to B UT, levator scap        DTM and trigger point release to L UT       PROM to B GHJ in all directions      Pt guarding and reporting pain in lat on R   PA mobs centrally though thor spine grade 2-3, trigger point release to L UT and L rhomboids, manual stretch to L UT   X 12 min               Modalities:   4/6, 3/23, 3/18, 3/11, 3/4, 2/27: MHP to cerv spine and thor spine in supine with warm blanket x 15 min   2/23: MHP to cerv spine and thor spine in supine with towel roll under head x 10 min     Pt. Education:  3/23: Kitchen Fulling goals, discussed progress made and areas remaining for improvement, pt to continue with GHJ AAROM at home and soft tissue mobility     3/16: Reassessed goals, discussed progress made and areas remaining for improvement, advised pt to continue with thor ext to increase mobility in thor spine   2/16: patient educated on diagnosis, prognosis and expectations for rehab, all patient questions were answered    Home Exercise Program:  Access Code: [email or web address]  URL: [email or web address]  Date: 02/27/2021  Prepared by: Luna Flowers    Exercises  Seated Cervical Sidebending Stretch - 10 reps - 3 sets - 1x daily - 7x weekly  Seated Levator Scapulae Stretch - 10 reps - 3 sets - 1x daily - 7x proximal hip and/or LS spine soft tissue/joints for the purpose of modulating pain, promoting relaxation,  increasing ROM, reducing/eliminating soft tissue swelling/inflammation/restriction, improving soft tissue extensibility and allowing for proper ROM for normal function with   [] LE / lumbar: self care, mobility, lifting and ambulation. [x] UE / Cervical: self care, reaching, carrying, lifting, house/yardwork, driving, computer work. Modalities:  [] (98410) Vasopneumatic compression: Utilized vasopneumatic compression to decrease edema / swelling for the purpose of improving mobility and quad tone / recruitment which will allow for increased overall function including but not limited to self-care, transfers, ambulation, and ascending / descending stairs. Charges:  Timed Code Treatment Minutes: 42   Total Treatment Minutes: 57     [] EVAL - LOW (46116)   [] EVAL - MOD (30028)  [] EVAL - HIGH (29573)  [] RE-EVAL (15405)  [x] PC(68335) x 1       [] Ionto  [x] NMR (14670) x   1    [] Vaso  [x] Manual (13337) x 1         [] Ultrasound  [] TA x        [] Mech Traction (36260)  [] Aquatic Therapy x                     [] ES (un) (82423):   [] Home Management Training x  [] ES(attended) (67589)   [] Dry Needling 1-2 muscles (78411):  [] Dry Needling 3+ muscles (443398)  [] Group:      [] Other:     GOALS:  Short Term Goals: To be achieved in: 2 weeks  1. Independent in HEP and progression per patient tolerance, in order to prevent re-injury. []? Progressing: [x]? Met: []? Not Met: []? Adjusted  2. Patient will have a decrease in pain to facilitate improvement in movement, function, and ADLs as indicated by Functional Deficits. []? Progressing: [x]? Met: []? Not Met: []? Adjusted     Long Term Goals: To be achieved in: 5 weeks  1. Disability index score of 10% or less for the QDASH to assist with reaching prior level of function. [x]? Progressing: []? Met: []? Not Met: []? Adjusted  2.  Patient will demonstrate increased AROM in L GHJ to be equal or greater than ROM in R GHJ to allow for proper joint functioning as indicated by patients Functional Deficits. [x]? Progressing: []? Met: []? Not Met: []? Adjusted  3. Patient will demonstrate an increase in postural awareness and control and activation of deep cervical stabilizers to allow for proper functional mobility as indicated by patients Functional Deficits. []? Progressing: [x]? Met: []? Not Met: []? Adjusted  4. Patient will demonstrate an increase in Strength in L GHJ to 5/5 to allow for proper functional mobility as indicated by patients Functional Deficits. [x]? Progressing: []? Met: []? Not Met: []? Adjusted    Overall Progression Towards Functional goals/ Treatment Progress Update:  [] Patient is progressing as expected towards functional goals listed. [x] Progression is slowed due to complexities/Impairments listed. [] Progression has been slowed due to co-morbidities. [] Plan just implemented, too soon to assess goals progression <30days   [] Goals require adjustment due to lack of progress  [] Patient is not progressing as expected and requires additional follow up with physician  [] Other    Persisting Functional Limitations/Impairments:  []Sleeping []Sitting               []Standing []Transfers        []Walking []Kneeling               []Stairs []Squatting / bending   []ADLs [x]Reaching  [x]Lifting  []Housework  [x]Driving []Job related tasks  []Sports/Recreation []Other:        ASSESSMENT: Pt appears to be gaining range and mobility in GHJ and thor spine. Pain level has decreased, but is still present. Trigger points continue in L UT and rhomboids. Plan to continue with muscle flexibility and strength.        Treatment/Activity Tolerance:  [] Patient able to complete tx  [] Patient limited by fatigue  [x] Patient limited by pain  [] Patient limited by other medical complications  [] Other:     Prognosis: [x] Good [] Fair  []

## 2021-04-09 ENCOUNTER — HOSPITAL ENCOUNTER (OUTPATIENT)
Dept: PHYSICAL THERAPY | Age: 46
Setting detail: THERAPIES SERIES
Discharge: HOME | End: 2021-04-09
Payer: COMMERCIAL

## 2021-04-09 PROCEDURE — 97140 MANUAL THERAPY 1/> REGIONS: CPT

## 2021-04-09 PROCEDURE — 97110 THERAPEUTIC EXERCISES: CPT

## 2021-04-09 PROCEDURE — 97112 NEUROMUSCULAR REEDUCATION: CPT

## 2021-04-09 NOTE — FLOWSHEET NOTE
[address] [facility] Physical Therapy  Phone: [phone]   Fax: [phone]        Physical Therapy Daily Treatment Note  Date:  2021    Patient Name:  Bettina La    :  1975  MRN: [id number]  Medical/Treatment Diagnosis Information:  Diagnosis: low back pain (actually thor pain)  Treatment Diagnosis: decreased thor spine mobility, limited B GHJ AROM, decreased L GHJ strength, adhesions in soft tissue through scapulothoracic region on L  Insurance/Certification information:  PT Insurance Information: [employer] (med nec, no dry needling)  Physician Information:  Referring Practitioner: Lynda Monge MD  Plan of care signed (Y/N): []  Yes [x]  No     Date of Patient follow up with Physician: ?     Progress Report: []  Yes  [x]  No     Date Range for reporting period:  Beginning: 3/16/21   Ending: 3/23/21    Progress report due (10 Rx/or 30 days whichever is less): visit 16 or      Recertification due (POC duration/ or 90 days whichever is less): visit #16 or 21     Visit # Insurance Allowable Auth required? Date Range   10/10 +  Med nec, NO DRY NEEDLING []  Yes  [x]  No n/a     Latex Allergy:  [x]NO      []YES  Preferred Language for Healthcare:   [x]English       []other:    Functional Scale:        Date assessed:  QDASH: raw score = 31; dysfunction = 45%                               21  QDASH: raw score = 28; dysfunction = 38%                               3/16/21    Pain level:  5/10      SUBJECTIVE: Pt. Reports that overall her pain is improving but continues to have pain that gets worse toward the end of the day. Pt. Continues to have difficulty and pain on L reaching overhead for doing her hair and reaching into overhead cabinets.        OBJECTIVE:   3/23:  ROM   Comments   Cervical Flexion 55 deg     Cervical Extension 40 deg             Lumbar Flexion 70 deg     Lumbar Extension 15 deg        ROM LEFT RIGHT Comments   Cervical Side Bend 30 deg  40 deg     Cervical Rotation         Shoulder Flex 130 120     Shoulder Abd 115 160     Shoulder ER 50 62     Shoulder IR 50 50              Lumbar Side Bend 10 deg 10 deg     Lumbar Rotation         Hip Flexion         Hip Abd         Hip ER         Hip IR               Strength LEFT RIGHT Comments   Shoulder flexion 5 4+     Shoulder scaption 5 4+     Shoulder ER 5 4+     Shoulder IR 5 4+     Biceps 5 5     Triceps 5 5                              3/16:     ROM   Comments   Cervical Flexion 45 deg     Cervical Extension 35 deg             Lumbar Flexion 70 deg     Lumbar Extension 15 deg        ROM LEFT RIGHT Comments   Cervical Side Bend 40 deg  40 deg     Cervical Rotation         Shoulder Flex 130 125     Shoulder Abd 115 145     Shoulder ER 50 62     Shoulder IR 20 42 Painful on L when moving actively      Strength LEFT RIGHT Comments   Shoulder flexion 4+ 5     Shoulder scaption 4+ 5     Shoulder ER 4+ 4+     Shoulder IR 4+ 5     Biceps 5 5     Triceps 5 5        2/23: pt 15 min late       RESTRICTIONS/PRECAUTIONS:     Exercises/Interventions:     Therapeutic Exercises (40442) Resistance / level Sets/sec Reps Notes   Wand flexion    Wand abd    Sitting scap retract     UBE   About 10 rpm   Cables:  Mid rows   LPD  High rows   D2 flexion   D2 extension   Shoulder adduction   Lawn mower    35#  35#  35#    15#       2  2  2    2       x10  x10  x10     x10                 Pulleys:  Flexion  scaption  X 2 min  X 2 min   3 way wall star    PREs:  Flexion to 90 deg  scaption to 90 deg     Free weights:  Bicep curl  B ER  OH press   tricep kick back     Push up PLUS on wall   Painful    Doorway stretch      [employer]:  tricep push down  Chest press   shoulder press   LPD  TRX mid rows   flys                   Therapeutic Activities (85283)                                          Neuromuscular Re-ed (19918)       Thor ext over bolster in sitting   Scap retract around bolster in sitting with chin tuck Red  Red  10 sec H  10 sec H x10  x10    Self foam roll to scap on wall   Rhomboid stretch and pressure with foam roll on wall     UT stretch   LS stretch     1/2 foam roll on wall:  GHJ alt flexion  GJH abd     1/2 foam roll along spine:  GHJ flexion  GHJ abd     Self trigger point release to L UT with theracane      Wall forearm slide     Prone:  Retraction  Ext  Row    SL scap retraction     Prove over SB:  GHJ ext  GHJ abd   Rows        Thread the needle   Push up on ballet barre  Lat stretch   LS stretch on ballet barre    2  1 min  30 sec   15  x2  x2 B     Full foam roll in supine with :  GHJ flexion  GHJ abd             Manual Intervention (83991)       DMT to B UTs and parapsinals, PA mobs to thor spine centrally and generally grade 2        DTM to B UT and LS in prone, hawkgrips #9 brushes and strums to B UT, levator scap        DTM and trigger point release to L UT       PROM to B GHJ in all directions      Pt guarding and reporting pain in lat on R   PA mobs centrally though thor spine grade 2-3, trigger point release to L UT and L rhomboids, manual stretch to L UT       Prone PA mobs centrally specific and general grade 2-3 through thor and low cerv spine, DTM to thor paraspinals, trigger point release to L UT   X 11 min                             Modalities:   4/9, 4/6, 3/23, 3/18, 3/11, 3/4, 2/27: MHP to cerv spine and thor spine in supine with warm blanket x 15 min   2/23: MHP to cerv spine and thor spine in supine with towel roll under head x 10 min     Pt.  Education:  3/23: Elian Kwon goals, discussed progress made and areas remaining for improvement, pt to continue with GHJ AAROM at home and soft tissue mobility     3/16: Reassessed goals, discussed progress made and areas remaining for improvement, advised pt to continue with thor ext to increase mobility in thor spine   2/16: patient educated on diagnosis, prognosis and expectations for rehab, all patient questions were answered    Home Exercise Program:  Access Code: [email or web address]  URL: [email or web address]  Date: 02/27/2021  Prepared by: Britney Meadows    Exercises  Seated Cervical Sidebending Stretch - 10 reps - 3 sets - 1x daily - 7x weekly  Seated Levator Scapulae Stretch - 10 reps - 3 sets - 1x daily - 7x weekly    Access Code: [facility]   URL: "[email or web address]/   Date: 02/16/2021   Prepared by: Live Oaknora Meadows     Exercises   Supine Shoulder Flexion with Dowel - 10 reps - 3 sets - 1x daily - 7x weekly   Supine Shoulder Abduction AAROM with Dowel - 10 reps - 3 sets - 1x daily - 7x weekly   Seated Scapular Retraction - 10 reps - 3 sets - 1x daily - 7x weekly         Therapeutic Exercise and NMR EXR  [x] (29025) Provided verbal/tactile cueing for activities related to strengthening, flexibility, endurance, ROM for improvements in  [] LE / Lumbar: LE, proximal hip, and core control with self care, mobility, lifting, ambulation. [x] UE / Cervical: cervical, postural, scapular, scapulothoracic and UE control with self care, reaching, carrying, lifting, house/yardwork, driving, computer work.  [] (44024) Provided verbal/tactile cueing for activities related to improving balance, coordination, kinesthetic sense, posture, motor skill, proprioception to assist with   [] LE / lumbar: LE, proximal hip, and core control in self care, mobility, lifting, ambulation and eccentric single leg control. [] UE / cervical: cervical, scapular, scapulothoracic and UE control with self care, reaching, carrying, lifting, house/yardwork, driving, computer work.   [] (79380) Therapist is in constant attendance of 2 or more patients providing skilled therapy interventions, but not providing any significant amount of measurable one-on-one time to either patient, for improvements in  [] LE / lumbar: LE, proximal hip, and core control in self care, mobility, lifting, ambulation and eccentric single leg control.    [] UE / cervical: cervical, scapular, postural,  scapular, scapulothoracic and UE control with self care, reaching, carrying, lifting, house/yardwork, driving, computer work    Manual Treatments:  PROM / STM / Oscillations-Mobs:  G-I, II, III, IV (PA's, Inf., Post.)  [x] (70550) Provided manual therapy to mobilize LE, proximal hip and/or LS spine soft tissue/joints for the purpose of modulating pain, promoting relaxation,  increasing ROM, reducing/eliminating soft tissue swelling/inflammation/restriction, improving soft tissue extensibility and allowing for proper ROM for normal function with   [] LE / lumbar: self care, mobility, lifting and ambulation. [x] UE / Cervical: self care, reaching, carrying, lifting, house/yardwork, driving, computer work. Modalities:  [] (14569) Vasopneumatic compression: Utilized vasopneumatic compression to decrease edema / swelling for the purpose of improving mobility and quad tone / recruitment which will allow for increased overall function including but not limited to self-care, transfers, ambulation, and ascending / descending stairs. Charges:  Timed Code Treatment Minutes: 43   Total Treatment Minutes: 58     [] EVAL - LOW (60192)   [] EVAL - MOD (17587)  [] EVAL - HIGH (38576)  [] RE-EVAL (66197)  [x] MJ(27180) x 1       [] Ionto  [x] NMR (93198) x   1    [] Vaso  [x] Manual (44217) x 1         [] Ultrasound  [] TA x        [] Mech Traction (84134)  [] Aquatic Therapy x                     [] ES (un) (29469):   [] Home Management Training x  [] ES(attended) (31327)   [] Dry Needling 1-2 muscles (37478):  [] Dry Needling 3+ muscles (936415)  [] Group:      [] Other:     GOALS:  Short Term Goals: To be achieved in: 2 weeks  1. Independent in HEP and progression per patient tolerance, in order to prevent re-injury. []? Progressing: [x]? Met: []? Not Met: []? Adjusted  2.  Patient will have a decrease in pain to facilitate improvement in movement, function, and ADLs as indicated by Functional Deficits. []? Progressing: [x]? Met: []? Not Met: []? Adjusted     Long Term Goals: To be achieved in: 5 weeks  1. Disability index score of 10% or less for the QDASH to assist with reaching prior level of function. [x]? Progressing: []? Met: []? Not Met: []? Adjusted  2. Patient will demonstrate increased AROM in L GHJ to be equal or greater than ROM in R GHJ to allow for proper joint functioning as indicated by patients Functional Deficits. [x]? Progressing: []? Met: []? Not Met: []? Adjusted  3. Patient will demonstrate an increase in postural awareness and control and activation of deep cervical stabilizers to allow for proper functional mobility as indicated by patients Functional Deficits. []? Progressing: [x]? Met: []? Not Met: []? Adjusted  4. Patient will demonstrate an increase in Strength in L GHJ to 5/5 to allow for proper functional mobility as indicated by patients Functional Deficits. [x]? Progressing: []? Met: []? Not Met: []? Adjusted    Overall Progression Towards Functional goals/ Treatment Progress Update:  [] Patient is progressing as expected towards functional goals listed. [x] Progression is slowed due to complexities/Impairments listed. [] Progression has been slowed due to co-morbidities. [] Plan just implemented, too soon to assess goals progression <30days   [] Goals require adjustment due to lack of progress  [] Patient is not progressing as expected and requires additional follow up with physician  [] Other    Persisting Functional Limitations/Impairments:  []Sleeping []Sitting               []Standing []Transfers        []Walking []Kneeling               []Stairs []Squatting / bending   []ADLs [x]Reaching  [x]Lifting  []Housework  [x]Driving []Job related tasks  []Sports/Recreation []Other:        ASSESSMENT: Pt's thor spine with improved mobility this date. Soft tissues remain tight on L. Pt has started stretches at home and feels there is improvement.  Plan to continue with progressive strength, thor mobility, and decreased pain. Treatment/Activity Tolerance:  [] Patient able to complete tx  [] Patient limited by fatigue  [x] Patient limited by pain  [] Patient limited by other medical complications  [] Other:     Prognosis: [x] Good [] Fair  [] Poor    Patient Requires Follow-up: [x] Yes  [] No    Plan for next treatment session:  Manual to L GHJ and thor spine for increased mobility, L GHJ strengthening, mid back strength, posture retraining, MOC     PLAN: See eval. PT 2x / week for 3 weeks. [x] Continue per plan of care [] Alter current plan (see comments)  [] Plan of care initiated [] Hold pending MD visit [] Discharge    Electronically signed by: Brandy Church PT, DPT/ Seb Gonzalez PT, DPT      Note: If patient does not return for scheduled/ recommended follow up visits, this note will serve as a discharge from care along with most recent update on progress.

## 2021-04-12 ENCOUNTER — HOSPITAL ENCOUNTER (OUTPATIENT)
Dept: PHYSICAL THERAPY | Age: 46
Setting detail: THERAPIES SERIES
Discharge: HOME | End: 2021-04-12
Payer: COMMERCIAL

## 2021-04-12 PROCEDURE — 97140 MANUAL THERAPY 1/> REGIONS: CPT

## 2021-04-12 PROCEDURE — 97110 THERAPEUTIC EXERCISES: CPT

## 2021-04-12 PROCEDURE — 97530 THERAPEUTIC ACTIVITIES: CPT

## 2021-04-12 NOTE — FLOWSHEET NOTE
Shoulder IR 50 50              Lumbar Side Bend 10 deg 10 deg     Lumbar Rotation         Hip Flexion         Hip Abd         Hip ER         Hip IR               Strength LEFT RIGHT Comments   Shoulder flexion 5 4+     Shoulder scaption 5 4+     Shoulder ER 5 4+     Shoulder IR 5 4+     Biceps 5 5     Triceps 5 5                              3/16:     ROM   Comments   Cervical Flexion 45 deg     Cervical Extension 35 deg             Lumbar Flexion 70 deg     Lumbar Extension 15 deg        ROM LEFT RIGHT Comments   Cervical Side Bend 40 deg  40 deg     Cervical Rotation         Shoulder Flex 130 125     Shoulder Abd 115 145     Shoulder ER 50 62     Shoulder IR 20 42 Painful on L when moving actively      Strength LEFT RIGHT Comments   Shoulder flexion 4+ 5     Shoulder scaption 4+ 5     Shoulder ER 4+ 4+     Shoulder IR 4+ 5     Biceps 5 5     Triceps 5 5        2/23: pt 15 min late       RESTRICTIONS/PRECAUTIONS:     Exercises/Interventions:     Therapeutic Exercises (27677) Resistance / level Sets/sec Reps Notes   Wand flexion    Wand abd    Sitting scap retract     UBE   About 10 rpm   Cables:  Mid rows   LPD  High rows   D2 flexion   D2 extension   Shoulder adduction   Lawn mower  10#1x10 B both hands on bar            Pulleys:  Flexion  scaption  X 2 min  X 2 min   3 way wall star    PREs:  Flexion to 90 deg  scaption to 90 deg     Free weights:  Bicep curl  B ER  OH press   tricep kick back     Push up PLUS on wall   Painful    Doorway stretch      [employer]:  tricep push down  Chest press   shoulder press   LPD  TRX mid rows   flys                   Therapeutic Activities (83579)       Lift to Shelf 1st and 2nd   FWD  Lateral   7#  7#   1  1   10  10    Praying mantis stretch   60 sec x2    Praying mantis UE lifts   1 x10 B Mini ROM                 Neuromuscular Re-ed (13602)       Thor ext over bolster in sitting   Scap retract around bolster in sitting with chin tuck      Self foam roll to scap on wall   Rhomboid stretch and pressure with foam roll on wall     UT stretch   LS stretch     1/2 foam roll on wall:  GHJ alt flexion  GJH abd     1/2 foam roll along spine:  GHJ flexion  GHJ abd     Self trigger point release to L UT with theracane      Wall forearm slide     Prone:  Retraction  Ext  Row    SL scap retraction     Prove over SB:  GHJ ext  GHJ abd   Rows        Thread the needle   Push up on ballet barre  Lat stretch   LS stretch on ballet barre     Full foam roll in supine with :  GHJ flexion  GHJ abd      Foam roll along scap self mob on wall with squat   X 3 min                          Manual Intervention (88883)       DMT to B UTs and parapsinals, PA mobs to thor spine centrally and generally grade 2        DTM to B UT and LS in prone, hawkgrips #9 brushes and strums to B UT, levator scap        DTM and trigger point release to L UT       PROM to B GHJ in all directions      Pt guarding and reporting pain in lat on R   PA mobs centrally though thor spine grade 2-3, trigger point release to L UT and L rhomboids, manual stretch to L UT       Prone PA mobs centrally specific and general grade 2-3 through thor and low cerv spine, DTM to thor paraspinals, trigger point release to L UT       Grade 5 CT manip in sitting, PA mobs grade 2-3 to low cerv and thoracic, STM to thor paraspinals    X 13 min                       Modalities:   4/12, 4/9, 4/6, 3/23, 3/18, 3/11, 3/4, 2/27: MHP to cerv spine and thor spine in supine with warm blanket x 15 min   2/23: MHP to cerv spine and thor spine in supine with towel roll under head x 10 min     Pt.  Education:  3/23: Lelo Monroy goals, discussed progress made and areas remaining for improvement, pt to continue with GHJ AAROM at home and soft tissue mobility     3/16: Reassessed goals, discussed progress made and areas remaining for improvement, advised pt to continue with thor ext to increase mobility in thor spine   2/16: patient educated on diagnosis, prognosis and expectations for rehab, all patient questions were answered    Home Exercise Program:  Access Code: [email or web address]  URL: [email or web address]  Date: 02/27/2021  Prepared by: Real Range    Exercises  Seated Cervical Sidebending Stretch - 10 reps - 3 sets - 1x daily - 7x weekly  Seated Levator Scapulae Stretch - 10 reps - 3 sets - 1x daily - 7x weekly    Access Code: [facility]   URL: [email or web address]/   Date: 02/16/2021   Prepared by: Real Range     Exercises   Supine Shoulder Flexion with Dowel - 10 reps - 3 sets - 1x daily - 7x weekly   Supine Shoulder Abduction AAROM with Dowel - 10 reps - 3 sets - 1x daily - 7x weekly   Seated Scapular Retraction - 10 reps - 3 sets - 1x daily - 7x weekly         Therapeutic Exercise and NMR EXR  [x] (78789) Provided verbal/tactile cueing for activities related to strengthening, flexibility, endurance, ROM for improvements in  [] LE / Lumbar: LE, proximal hip, and core control with self care, mobility, lifting, ambulation. [x] UE / Cervical: cervical, postural, scapular, scapulothoracic and UE control with self care, reaching, carrying, lifting, house/yardwork, driving, computer work.  [] (69589) Provided verbal/tactile cueing for activities related to improving balance, coordination, kinesthetic sense, posture, motor skill, proprioception to assist with   [] LE / lumbar: LE, proximal hip, and core control in self care, mobility, lifting, ambulation and eccentric single leg control.    [] UE / cervical: cervical, scapular, scapulothoracic and UE control with self care, reaching, carrying, lifting, house/yardwork, driving, computer work.   [] (48331) Therapist is in constant attendance of 2 or more patients providing skilled therapy interventions, but not providing any significant amount of measurable one-on-one time to either patient, for improvements in  [] LE / lumbar: LE, proximal hip, and core control in self care, mobility, lifting, ambulation and eccentric single leg control. [] UE / cervical: cervical, scapular, scapulothoracic and UE control with self care, reaching, carrying, lifting, house/yardwork, driving, computer work. NMR and Therapeutic Activities:    [x] (11935 or 28475) Provided verbal/tactile cueing for activities related to improving balance, coordination, kinesthetic sense, posture, motor skill, proprioception and motor activation to allow for proper function of   [] LE: / Lumbar core, proximal hip and LE with self care and ADLs  [x] UE / Cervical: cervical, postural, scapular, scapulothoracic and UE control with self care, carrying, lifting, driving, computer work.   [] (64425) Gait Re-education- Provided training and instruction to the patient for proper LE, core and proximal hip recruitment and positioning and eccentric body weight control with ambulation re-education including up and down stairs     Home Management Training / Self Care:  [] (46355) Provided self-care/home management training related to activities of daily living and compensatory training, and/or use of adaptive equipment for improvement with: ADLs and compensatory training, meal preparation, safety procedures and instruction in use of adaptive equipment, including bathing, grooming, dressing, personal hygiene, basic household cleaning and chores.      Home Exercise Program:    [] (84138) Reviewed/Progressed HEP activities related to strengthening, flexibility, endurance, ROM of   [] LE / Lumbar: core, proximal hip and LE for functional self-care, mobility, lifting and ambulation/stair navigation   [] UE / Cervical: cervical, postural, scapular, scapulothoracic and UE control with self care, reaching, carrying, lifting, house/yardwork, driving, computer work  [] (90207)Reviewed/Progressed HEP activities related to improving balance, coordination, kinesthetic sense, posture, motor skill, proprioception of   [] LE: core, proximal hip and LE for self care, mobility, lifting, and ambulation/stair navigation    [] UE / Cervical: cervical, postural,  scapular, scapulothoracic and UE control with self care, reaching, carrying, lifting, house/yardwork, driving, computer work    Manual Treatments:  PROM / STM / Oscillations-Mobs:  G-I, II, III, IV (PA's, Inf., Post.)  [x] (40502) Provided manual therapy to mobilize LE, proximal hip and/or LS spine soft tissue/joints for the purpose of modulating pain, promoting relaxation,  increasing ROM, reducing/eliminating soft tissue swelling/inflammation/restriction, improving soft tissue extensibility and allowing for proper ROM for normal function with   [] LE / lumbar: self care, mobility, lifting and ambulation. [x] UE / Cervical: self care, reaching, carrying, lifting, house/yardwork, driving, computer work. Modalities:  [] (48051) Vasopneumatic compression: Utilized vasopneumatic compression to decrease edema / swelling for the purpose of improving mobility and quad tone / recruitment which will allow for increased overall function including but not limited to self-care, transfers, ambulation, and ascending / descending stairs. Charges:  Timed Code Treatment Minutes: 44   Total Treatment Minutes: 59     [] EVAL - LOW (73339)   [] EVAL - MOD (66450)  [] EVAL - HIGH (78543)  [] RE-EVAL (62995)  [x] ZV(06595) x 1       [] Ionto  [x] NMR (21131) x   1    [] Vaso  [x] Manual (68145) x 1         [] Ultrasound  [] TA x        [] Mech Traction (90569)  [] Aquatic Therapy x                     [] ES (un) (93672):   [] Home Management Training x  [] ES(attended) (92632)   [] Dry Needling 1-2 muscles (71226):  [] Dry Needling 3+ muscles (227439)  [] Group:      [] Other:     GOALS:  Short Term Goals: To be achieved in: 2 weeks  1. Independent in HEP and progression per patient tolerance, in order to prevent re-injury. []? Progressing: [x]? Met: []? Not Met: []? Adjusted  2.  Patient will have a decrease in pain to facilitate improvement in movement, function, and ADLs as indicated by Functional Deficits. []? Progressing: [x]? Met: []? Not Met: []? Adjusted     Long Term Goals: To be achieved in: 5 weeks  1. Disability index score of 10% or less for the QDASH to assist with reaching prior level of function. [x]? Progressing: []? Met: []? Not Met: []? Adjusted  2. Patient will demonstrate increased AROM in L GHJ to be equal or greater than ROM in R GHJ to allow for proper joint functioning as indicated by patients Functional Deficits. [x]? Progressing: []? Met: []? Not Met: []? Adjusted  3. Patient will demonstrate an increase in postural awareness and control and activation of deep cervical stabilizers to allow for proper functional mobility as indicated by patients Functional Deficits. []? Progressing: [x]? Met: []? Not Met: []? Adjusted  4. Patient will demonstrate an increase in Strength in L GHJ to 5/5 to allow for proper functional mobility as indicated by patients Functional Deficits. [x]? Progressing: []? Met: []? Not Met: []? Adjusted    Overall Progression Towards Functional goals/ Treatment Progress Update:  [] Patient is progressing as expected towards functional goals listed. [x] Progression is slowed due to complexities/Impairments listed. [] Progression has been slowed due to co-morbidities. [] Plan just implemented, too soon to assess goals progression <30days   [] Goals require adjustment due to lack of progress  [] Patient is not progressing as expected and requires additional follow up with physician  [] Other    Persisting Functional Limitations/Impairments:  []Sleeping []Sitting               []Standing []Transfers        []Walking []Kneeling               []Stairs []Squatting / bending   []ADLs [x]Reaching  [x]Lifting  []Housework  [x]Driving []Job related tasks  []Sports/Recreation []Other:        ASSESSMENT: Pt had difficulty with thor ext this date, but did respond well to CT manip.  Plan to add skin rolling at next visit. Pt still complains of pain in mid back, connie with rotation. Treatment/Activity Tolerance:  [] Patient able to complete tx  [] Patient limited by fatigue  [x] Patient limited by pain  [] Patient limited by other medical complications  [] Other:     Prognosis: [x] Good [] Fair  [] Poor    Patient Requires Follow-up: [x] Yes  [] No    Plan for next treatment session:  Manual to L GHJ and thor spine for increased mobility, L GHJ strengthening, mid back strength, posture retraining, MOC     PLAN: See eval. PT 2x / week for 3 weeks. [x] Continue per plan of care [] Alter current plan (see comments)  [] Plan of care initiated [] Hold pending MD visit [] Discharge    Electronically signed by:  CHAPITO Phelps    Therapist was present, directed the patient's care, made skilled judgement, and was responsible for assessment and treatment of the patient. Maximo Caballero PT, DPT      Note: If patient does not return for scheduled/ recommended follow up visits, this note will serve as a discharge from care along with most recent update on progress.

## 2021-04-16 ENCOUNTER — HOSPITAL ENCOUNTER (OUTPATIENT)
Dept: PHYSICAL THERAPY | Age: 46
Setting detail: THERAPIES SERIES
Discharge: HOME | End: 2021-04-16
Payer: COMMERCIAL

## 2021-04-16 PROCEDURE — 97140 MANUAL THERAPY 1/> REGIONS: CPT

## 2021-04-16 PROCEDURE — 97110 THERAPEUTIC EXERCISES: CPT

## 2021-04-16 PROCEDURE — 97112 NEUROMUSCULAR REEDUCATION: CPT

## 2021-04-16 NOTE — FLOWSHEET NOTE
[facility] Physical Therapy  Phone: [phone]   Fax: [phone]        Physical Therapy Daily Treatment Note  Date:  2021    Patient Name:  Fritz Rowe    :  1975  MRN: [id number]  Medical/Treatment Diagnosis Information:  Diagnosis: low back pain (actually thor pain)  Treatment Diagnosis: decreased thor spine mobility, limited B GHJ AROM, decreased L GHJ strength, adhesions in soft tissue through scapulothoracic region on L  Insurance/Certification information:  PT Insurance Information: [employer] (med nec, no dry needling)  Physician Information:  Referring Practitioner: Betsy Price MD  Plan of care signed (Y/N): []  Yes [x]  No     Date of Patient follow up with Physician: ?     Progress Report: []  Yes  [x]  No     Date Range for reporting period:  Beginning: 3/16/21   Ending: 3/23/21    Progress report due (10 Rx/or 30 days whichever is less): visit 16 or 52     Recertification due (POC duration/ or 90 days whichever is less): visit #16 or 21     Visit # Insurance Allowable Auth required? Date Range   10/10 +  Med nec, NO DRY NEEDLING []  Yes  [x]  No n/a     Latex Allergy:  [x]NO      []YES  Preferred Language for Healthcare:   [x]English       []other:    Functional Scale:        Date assessed:  QDASH: raw score = 31; dysfunction = 45%                               21  QDASH: raw score = 28; dysfunction = 38%                               3/16/21    Pain level:  4.5-5/10      SUBJECTIVE: Patient reports she is feeling a little better. Pain has decreased. Feels she is to the point where if she doesn't have tingling and sharp pains she's happy. Is just feeling some stiffness and muscle fatigue.        OBJECTIVE:   3/23:  ROM   Comments   Cervical Flexion 55 deg     Cervical Extension 40 deg             Lumbar Flexion 70 deg     Lumbar Extension 15 deg        ROM LEFT RIGHT Comments   Cervical Side Bend 30 deg  40 deg   Cervical Rotation         Shoulder Flex 130 120     Shoulder Abd 115 160     Shoulder ER 50 62     Shoulder IR 50 50              Lumbar Side Bend 10 deg 10 deg     Lumbar Rotation         Hip Flexion         Hip Abd         Hip ER         Hip IR               Strength LEFT RIGHT Comments   Shoulder flexion 5 4+     Shoulder scaption 5 4+     Shoulder ER 5 4+     Shoulder IR 5 4+     Biceps 5 5     Triceps 5 5                              3/16:     ROM   Comments   Cervical Flexion 45 deg     Cervical Extension 35 deg             Lumbar Flexion 70 deg     Lumbar Extension 15 deg        ROM LEFT RIGHT Comments   Cervical Side Bend 40 deg  40 deg     Cervical Rotation         Shoulder Flex 130 125     Shoulder Abd 115 145     Shoulder ER 50 62     Shoulder IR 20 42 Painful on L when moving actively      Strength LEFT RIGHT Comments   Shoulder flexion 4+ 5     Shoulder scaption 4+ 5     Shoulder ER 4+ 4+     Shoulder IR 4+ 5     Biceps 5 5     Triceps 5 5        2/23: pt 15 min late       RESTRICTIONS/PRECAUTIONS:     Exercises/Interventions:     Therapeutic Exercises (38973) Resistance / level Sets/sec Reps Notes   Wand flexion    Wand abd    Sitting scap retract     UBE   About 10 rpm   Cables:  Mid rows   LPD  High rows   D2 flexion   D2 extension   Shoulder adduction                 Pulleys:  Flexion  scaption  X 3 min  X 2 min   3 way wall star    PREs:  Flexion to 90 deg  scaption to 90 deg     Free weights:  Bicep curl  B ER  OH press   tricep kick back     Push up PLUS on wall   Painful    Doorway stretch      [employer]:  tricep push down  Chest press   shoulder press   LPD  TRX mid rows   flys     Prone:  I's  Y's  T's    1  1  1   x10 B  x10 B  x10 B           Therapeutic Activities (72150)       Lift to Shelf 1st and 2nd   FWD  Lateral    Praying mantis stretch     Praying mantis UE lifts  Mini ROM                 Neuromuscular Re-ed (31339)       Thor ext over bolster in sitting   Scap retract around bolster in sitting with chin tuck      Self foam roll to scap on wall   Rhomboid stretch and pressure with foam roll on wall     UT stretch   LS stretch     1/2 foam roll on wall:  GHJ alt flexion  GJH abd     1/2 foam roll along spine:  GHJ flexion  GHJ abd     Self trigger point release to L UT with theracane      Wall forearm slide     Prone:  Retraction  Ext  Row    SL scap retraction     Prove over SB:  GHJ ext  GHJ abd   Rows        Thread the needle   Push up on ballet barre  Lat stretch   LS stretch on ballet barre    Ballet barre push up with L head turn   UT stretch on ballet barre  LS stretch on ballet barre  1  30 sec  30 secx15  x2 B  x2 B   Full foam roll in supine with :  GHJ flexion  GHJ abd      Foam roll along scap self mob on wall with squat       Chest stretch laying supine over SB green 1 4 min                  Manual Intervention (11323)       DMT to B UTs and parapsinals, PA mobs to thor spine centrally and generally grade 2        DTM to B UT and LS in prone, hawkgrips #9 brushes and strums to B UT, levator scap        DTM and trigger point release to L UT       PROM to B GHJ in all directions      Pt guarding and reporting pain in lat on R   PA mobs centrally though thor spine grade 2-3, trigger point release to L UT and L rhomboids, manual stretch to L UT       Prone PA mobs centrally specific and general grade 2-3 through thor and low cerv spine, DTM to thor paraspinals, trigger point release to L UT   X 15 min    Grade 5 CT manip in sitting, PA mobs grade 2-3 to low cerv and thoracic, STM to thor paraspinals                          Modalities:   4/12, 4/9, 4/6, 3/23, 3/18, 3/11, 3/4, 2/27: MHP to cerv spine and thor spine in supine with warm blanket x 15 min   2/23: MHP to cerv spine and thor spine in supine with towel roll under head x 10 min     Pt.  Education:  3/23: Davis Jj goals, discussed progress made and areas remaining for improvement, pt to continue with [facility] AAROM at home and soft tissue mobility     3/16: Reassessed goals, discussed progress made and areas remaining for improvement, advised pt to continue with thor ext to increase mobility in thor spine   2/16: patient educated on diagnosis, prognosis and expectations for rehab, all patient questions were answered    Home Exercise Program:  Access Code: [email or web address]  URL: [email or web address]  Date: 02/27/2021  Prepared by: Francheska Rein    Exercises  Seated Cervical Sidebending Stretch - 10 reps - 3 sets - 1x daily - 7x weekly  Seated Levator Scapulae Stretch - 10 reps - 3 sets - 1x daily - 7x weekly    Access Code: [facility]   URL: [email or web address]   Date: 02/16/2021   Prepared by: Francheska Rein     Exercises   Supine Shoulder Flexion with Dowel - 10 reps - 3 sets - 1x daily - 7x weekly   Supine Shoulder Abduction AAROM with Dowel - 10 reps - 3 sets - 1x daily - 7x weekly   Seated Scapular Retraction - 10 reps - 3 sets - 1x daily - 7x weekly         Therapeutic Exercise and NMR EXR  [x] (64138) Provided verbal/tactile cueing for activities related to strengthening, flexibility, endurance, ROM for improvements in  [] LE / Lumbar: LE, proximal hip, and core control with self care, mobility, lifting, ambulation. [x] UE / Cervical: cervical, postural, scapular, scapulothoracic and UE control with self care, reaching, carrying, lifting, house/yardwork, driving, computer work.  [] (65579) Provided verbal/tactile cueing for activities related to improving balance, coordination, kinesthetic sense, posture, motor skill, proprioception to assist with   [] LE / lumbar: LE, proximal hip, and core control in self care, mobility, lifting, ambulation and eccentric single leg control.    [] UE / cervical: cervical, scapular, scapulothoracic and UE control with self care, reaching, carrying, lifting, house/yardwork, driving, computer work.   [] (40684) Therapist is in constant attendance of 2 or more patients house/yardwork, driving, computer work  [] (25467)Reviewed/Progressed HEP activities related to improving balance, coordination, kinesthetic sense, posture, motor skill, proprioception of   [] LE: core, proximal hip and LE for self care, mobility, lifting, and ambulation/stair navigation    [] UE / Cervical: cervical, postural,  scapular, scapulothoracic and UE control with self care, reaching, carrying, lifting, house/yardwork, driving, computer work    Manual Treatments:  PROM / STM / Oscillations-Mobs:  G-I, II, III, IV (PA's, Inf., Post.)  [x] (08397) Provided manual therapy to mobilize LE, proximal hip and/or LS spine soft tissue/joints for the purpose of modulating pain, promoting relaxation,  increasing ROM, reducing/eliminating soft tissue swelling/inflammation/restriction, improving soft tissue extensibility and allowing for proper ROM for normal function with   [] LE / lumbar: self care, mobility, lifting and ambulation. [x] UE / Cervical: self care, reaching, carrying, lifting, house/yardwork, driving, computer work. Modalities:  [] (35806) Vasopneumatic compression: Utilized vasopneumatic compression to decrease edema / swelling for the purpose of improving mobility and quad tone / recruitment which will allow for increased overall function including but not limited to self-care, transfers, ambulation, and ascending / descending stairs.      Charges:  Timed Code Treatment Minutes: 50   Total Treatment Minutes: 62     [] EVAL - LOW (71322)   [] EVAL - MOD (55642)  [] EVAL - HIGH (44660)  [] RE-EVAL (78165)  [x] ZR(45323) x 1       [] Ionto  [x] NMR (53302) x   1    [] Vaso  [x] Manual (76640) x 1         [] Ultrasound  [] TA x        [] Mech Traction (09358)  [] Aquatic Therapy x                     [] ES (un) (76061):   [] Home Management Training x  [] ES(attended) (74410)   [] Dry Needling 1-2 muscles (18820):  [] Dry Needling 3+ muscles (111590)  [] Group:      [] Other:     GOALS:  Short Term Limitations/Impairments:  []Sleeping []Sitting               []Standing []Transfers        []Walking []Kneeling               []Stairs []Squatting / bending   []ADLs [x]Reaching  [x]Lifting  []Housework  [x]Driving []Job related tasks  []Sports/Recreation []Other:        ASSESSMENT: Pt demo'ed very little ROM with prone mid back strengthening. B UTs still remain tight. Overall much better mobility. Plan to complete PN in two visits - may be ready for DC. Treatment/Activity Tolerance:  [] Patient able to complete tx  [] Patient limited by fatigue  [x] Patient limited by pain  [] Patient limited by other medical complications  [] Other:     Prognosis: [x] Good [] Fair  [] Poor    Patient Requires Follow-up: [x] Yes  [] No    Plan for next treatment session:  Manual to L GHJ and thor spine for increased mobility, L GHJ strengthening, mid back strength, posture retraining, MOC     PLAN: See eval. PT 2x / week for 3 weeks. [x] Continue per plan of care [] Alter current plan (see comments)  [] Plan of care initiated [] Hold pending MD visit [] Discharge    Electronically signed by:  Pearl Barnhart, SPT    Therapist was present, directed the patient's care, made skilled judgement, and was responsible for assessment and treatment of the patient. Joel Yuan PT, DPT      Note: If patient does not return for scheduled/ recommended follow up visits, this note will serve as a discharge from care along with most recent update on progress.

## 2021-04-19 ENCOUNTER — HOSPITAL ENCOUNTER (OUTPATIENT)
Dept: PHYSICAL THERAPY | Age: 46
Setting detail: THERAPIES SERIES
Discharge: HOME | End: 2021-04-19
Payer: COMMERCIAL

## 2021-04-19 PROCEDURE — 97140 MANUAL THERAPY 1/> REGIONS: CPT

## 2021-04-19 PROCEDURE — 97110 THERAPEUTIC EXERCISES: CPT

## 2021-04-19 NOTE — FLOWSHEET NOTE
Lumbar Side Bend 10 deg 10 deg     Lumbar Rotation         Hip Flexion         Hip Abd         Hip ER         Hip IR               Strength LEFT RIGHT Comments   Shoulder flexion 5 4+     Shoulder scaption 5 4+     Shoulder ER 5 4+     Shoulder IR 5 4+     Biceps 5 5     Triceps 5 5                              3/16:     ROM   Comments   Cervical Flexion 45 deg     Cervical Extension 35 deg             Lumbar Flexion 70 deg     Lumbar Extension 15 deg        ROM LEFT RIGHT Comments   Cervical Side Bend 40 deg  40 deg     Cervical Rotation         Shoulder Flex 130 125     Shoulder Abd 115 145     Shoulder ER 50 62     Shoulder IR 20 42 Painful on L when moving actively      Strength LEFT RIGHT Comments   Shoulder flexion 4+ 5     Shoulder scaption 4+ 5     Shoulder ER 4+ 4+     Shoulder IR 4+ 5     Biceps 5 5     Triceps 5 5        2/23: pt 15 min late       RESTRICTIONS/PRECAUTIONS:     Exercises/Interventions:     Therapeutic Exercises (23395) Resistance / level Sets/sec Reps Notes   Wand flexion    Wand abd    Sitting scap retract     UBE   About 10 rpm   Cables:  Mid rows   LPD  High rows   D2 flexion   D2 extension   Shoulder adduction                 Pulleys:  Flexion  scaption     3 way wall star    PREs:  Flexion to 90 deg  scaption to 90 deg     Free weights:  Bicep curl  B ER  OH press   tricep kick back     Push up PLUS on wall   Painful    Doorway stretch      [facility]:  tricep push down  Chest press   shoulder press   LPD  TRX mid rows   TRX Y's  flys   Push pulls on cables    Elliptical  10#  20#  30#10#    Level 1 2  2  2  2  22x10  x10  x10  x10  x10x10 B    X 5 min    Prone:  I's  Y's  T's            Therapeutic Activities (73449)       Lift to Shelf 1st and 2nd   FWD  Lateral    Praying mantis stretch     Praying mantis UE lifts  Mini ROM                 Neuromuscular Re-ed (55117)       Thor ext over bolster in sitting   Scap retract around bolster in sitting with chin tuck Self foam roll to scap on wall   Rhomboid stretch and pressure with foam roll on wall     UT stretch   LS stretch     1/2 foam roll on wall:  GHJ alt flexion  GJH abd     1/2 foam roll along spine:  GHJ flexion  GHJ abd     Self trigger point release to L UT with theracane      Wall forearm slide     Prone:  Retraction  Ext  Row    SL scap retraction     Prove over SB:  GHJ ext  GHJ abd   Rows        Thread the needle   Push up on ballet barre  Lat stretch   LS stretch on ballet barre    Ballet barre push up with L head turn   UT stretch on ballet barre  LS stretch on ballet barre     Full foam roll in supine with :  GHJ flexion  GHJ abd      Foam roll along scap self mob on wall with squat       Chest stretch laying supine over SB                  Manual Intervention (74125)       DMT to B UTs and parapsinals, PA mobs to thor spine centrally and generally grade 2        DTM to B UT and LS in prone, hawkgrips #9 brushes and strums to B UT, levator scap        DTM and trigger point release to L UT       PROM to B GHJ in all directions      Pt guarding and reporting pain in lat on R   PA mobs centrally though thor spine grade 2-3, trigger point release to L UT and L rhomboids, manual stretch to L UT       Prone PA mobs centrally specific and general grade 2-3 through thor and low cerv spine, DTM to thor paraspinals, trigger point release to L UT       Grade 5 CT manip in sitting, PA mobs grade 2-3 to low cerv and thoracic, STM to thor paraspinals        Grade 5 CT manip in sitting, PA mobs grade 2-3 to low cerv and thoracic, prone grade 5 manip to CT junction and mid thoracic, gentle neutral cerv traction, SOR   x10 min                Modalities:   4/19, 4/12, 4/9, 4/6, 3/23, 3/18, 3/11, 3/4, 2/27: MHP to cerv spine and thor spine in supine with warm blanket x 15 min   2/23: MHP to cerv spine and thor spine in supine with towel roll under head x 10 min     Pt.  Education:  3/23: Darnell Momin goals, discussed progress made and areas remaining for improvement, pt to continue with GHJ AAROM at home and soft tissue mobility     3/16: Reassessed goals, discussed progress made and areas remaining for improvement, advised pt to continue with thor ext to increase mobility in thor spine   2/16: patient educated on diagnosis, prognosis and expectations for rehab, all patient questions were answered    Home Exercise Program:  Access Code: [email or web address]  URL: [email or web address]  Date: 02/27/2021  Prepared by: Jennifer Abide    Exercises  Seated Cervical Sidebending Stretch - 10 reps - 3 sets - 1x daily - 7x weekly  Seated Levator Scapulae Stretch - 10 reps - 3 sets - 1x daily - 7x weekly    Access Code: [facility]   URL: [email or web address]   Date: 02/16/2021   Prepared by: Jennifer Abide     Exercises   Supine Shoulder Flexion with Dowel - 10 reps - 3 sets - 1x daily - 7x weekly   Supine Shoulder Abduction AAROM with Dowel - 10 reps - 3 sets - 1x daily - 7x weekly   Seated Scapular Retraction - 10 reps - 3 sets - 1x daily - 7x weekly         Therapeutic Exercise and NMR EXR  [x] (45199) Provided verbal/tactile cueing for activities related to strengthening, flexibility, endurance, ROM for improvements in  [] LE / Lumbar: LE, proximal hip, and core control with self care, mobility, lifting, ambulation. [x] UE / Cervical: cervical, postural, scapular, scapulothoracic and UE control with self care, reaching, carrying, lifting, house/yardwork, driving, computer work.  [] (26937) Provided verbal/tactile cueing for activities related to improving balance, coordination, kinesthetic sense, posture, motor skill, proprioception to assist with   [] LE / lumbar: LE, proximal hip, and core control in self care, mobility, lifting, ambulation and eccentric single leg control. [] UE / cervical: cervical, scapular, scapulothoracic and UE control with self care, reaching, carrying, lifting, house/yardwork, driving, computer work.    [] scapulothoracic and UE control with self care, reaching, carrying, lifting, house/yardwork, driving, computer work  [] (47858)Reviewed/Progressed HEP activities related to improving balance, coordination, kinesthetic sense, posture, motor skill, proprioception of   [] LE: core, proximal hip and LE for self care, mobility, lifting, and ambulation/stair navigation    [] UE / Cervical: cervical, postural,  scapular, scapulothoracic and UE control with self care, reaching, carrying, lifting, house/yardwork, driving, computer work    Manual Treatments:  PROM / STM / Oscillations-Mobs:  G-I, II, III, IV (PA's, Inf., Post.)  [x] (27699) Provided manual therapy to mobilize LE, proximal hip and/or LS spine soft tissue/joints for the purpose of modulating pain, promoting relaxation,  increasing ROM, reducing/eliminating soft tissue swelling/inflammation/restriction, improving soft tissue extensibility and allowing for proper ROM for normal function with   [] LE / lumbar: self care, mobility, lifting and ambulation. [x] UE / Cervical: self care, reaching, carrying, lifting, house/yardwork, driving, computer work. Modalities:  [] (42476) Vasopneumatic compression: Utilized vasopneumatic compression to decrease edema / swelling for the purpose of improving mobility and quad tone / recruitment which will allow for increased overall function including but not limited to self-care, transfers, ambulation, and ascending / descending stairs.      Charges:  Timed Code Treatment Minutes: 55   Total Treatment Minutes: 70     [] EVAL - LOW (54973)   [] EVAL - MOD (22908)  [] EVAL - HIGH (67376)  [] RE-EVAL (93110)  [x] RC(90122) x 3       [] Ionto  [] NMR (42329) x       [] Vaso  [x] Manual (88615) x 1         [] Ultrasound  [] TA x        [] Mech Traction (06593)  [] Aquatic Therapy x                     [] ES (un) (85932):   [] Home Management Training x  [] ES(attended) (96237)   [] Dry Needling 1-2 muscles (14141):  [] Dry Limitations/Impairments:  []Sleeping []Sitting               []Standing []Transfers        []Walking []Kneeling               []Stairs []Squatting / bending   []ADLs [x]Reaching  [x]Lifting  []Housework  [x]Driving []Job related tasks  []Sports/Recreation []Other:        ASSESSMENT: Pt still has not tried her equipment at home - encouraged her to try her row machine for a few mins this week. Plan to complete POC note at next visit, possibly extending for 2 more weeks at maximum to solidify HEP. Treatment/Activity Tolerance:  [] Patient able to complete tx  [] Patient limited by fatigue  [x] Patient limited by pain  [] Patient limited by other medical complications  [] Other:     Prognosis: [x] Good [] Fair  [] Poor    Patient Requires Follow-up: [x] Yes  [] No    Plan for next treatment session:  Manual to L GHJ and thor spine for increased mobility, L GHJ strengthening, mid back strength, posture retraining, MOC     PLAN: See eval. PT 2x / week for 3 weeks. [x] Continue per plan of care [] Alter current plan (see comments)  [] Plan of care initiated [] Hold pending MD visit [] Discharge    Electronically signed by:  CHAPITO Rivera    Therapist was present, directed the patient's care, made skilled judgement, and was responsible for assessment and treatment of the patient. Neeru Gibbs PT, DPT      Note: If patient does not return for scheduled/ recommended follow up visits, this note will serve as a discharge from care along with most recent update on progress.

## 2021-04-23 ENCOUNTER — IMMUNIZATION (OUTPATIENT)
Dept: PRIMARY CARE CLINIC | Age: 46
End: 2021-04-23
Payer: COMMERCIAL

## 2021-04-23 ENCOUNTER — HOSPITAL ENCOUNTER (OUTPATIENT)
Dept: PHYSICAL THERAPY | Age: 46
Setting detail: THERAPIES SERIES
Discharge: HOME | End: 2021-04-23
Payer: COMMERCIAL

## 2021-04-23 PROCEDURE — 91301 COVID-19, MODERNA VACCINE 100MCG/0.5ML DOSE: CPT | Performed by: FAMILY MEDICINE

## 2021-04-23 PROCEDURE — 97110 THERAPEUTIC EXERCISES: CPT

## 2021-04-23 PROCEDURE — 0012A COVID-19, MODERNA VACCINE 100MCG/0.5ML DOSE: CPT | Performed by: FAMILY MEDICINE
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
## 2021-10-28 ENCOUNTER — HOSPITAL ENCOUNTER (OUTPATIENT)
Dept: MAMMOGRAPHY | Age: 46
Discharge: HOME | End: 2021-11-02
Payer: COMMERCIAL

## 2021-10-28 VITALS — BODY MASS INDEX: 27 KG/M2 | WEIGHT: 168 LBS | HEIGHT: 66 IN

## 2021-10-28 DIAGNOSIS — Z12.31 VISIT FOR SCREENING MAMMOGRAM: ICD-10-CM

## 2021-10-28 PROCEDURE — 77063 BREAST TOMOSYNTHESIS BI: CPT

## 2022-10-31 ENCOUNTER — HOSPITAL ENCOUNTER (OUTPATIENT)
Dept: MAMMOGRAPHY | Age: 47
Discharge: HOME | End: 2022-10-31
Payer: COMMERCIAL

## 2022-10-31 VITALS — BODY MASS INDEX: 26.68 KG/M2 | WEIGHT: 166 LBS | HEIGHT: 66 IN

## 2022-10-31 DIAGNOSIS — Z12.31 VISIT FOR SCREENING MAMMOGRAM: ICD-10-CM

## 2022-10-31 PROCEDURE — 77063 BREAST TOMOSYNTHESIS BI: CPT

## 2023-10-31 ENCOUNTER — HOSPITAL ENCOUNTER (OUTPATIENT)
Dept: MAMMOGRAPHY | Age: 48
Discharge: HOME | End: 2023-10-31
Payer: COMMERCIAL

## 2023-10-31 VITALS — HEIGHT: 66 IN | WEIGHT: 170 LBS | BODY MASS INDEX: 27.32 KG/M2

## 2023-10-31 DIAGNOSIS — Z12.31 VISIT FOR SCREENING MAMMOGRAM: ICD-10-CM

## 2023-10-31 PROCEDURE — 77063 BREAST TOMOSYNTHESIS BI: CPT

## 2024-02-05 ENCOUNTER — HOSPITAL ENCOUNTER (OUTPATIENT)
Dept: GENERAL RADIOLOGY | Age: 49
Discharge: HOME | End: 2024-02-05
Attending: INTERNAL MEDICINE
Payer: COMMERCIAL

## 2024-02-05 DIAGNOSIS — R10.13 EPIGASTRIC PAIN: ICD-10-CM

## 2024-02-05 PROCEDURE — 74220 X-RAY XM ESOPHAGUS 1CNTRST: CPT

## 2024-12-19 ENCOUNTER — HOSPITAL ENCOUNTER (OUTPATIENT)
Dept: MAMMOGRAPHY | Age: 49
Discharge: HOME | End: 2024-12-19
Payer: COMMERCIAL

## 2024-12-19 VITALS — HEIGHT: 66 IN | BODY MASS INDEX: 27.97 KG/M2 | WEIGHT: 174 LBS

## 2024-12-19 DIAGNOSIS — Z12.31 VISIT FOR SCREENING MAMMOGRAM: ICD-10-CM

## 2024-12-19 PROCEDURE — 77063 BREAST TOMOSYNTHESIS BI: CPT

## 2025-08-05 ENCOUNTER — TRANSCRIBE ORDERS (OUTPATIENT)
Dept: ADMINISTRATIVE | Age: 50
End: 2025-08-05

## 2025-08-05 DIAGNOSIS — Z12.39 ENCOUNTER FOR OTHER SCREENING FOR MALIGNANT NEOPLASM OF BREAST: Primary | ICD-10-CM
